# Patient Record
Sex: MALE | Race: WHITE | NOT HISPANIC OR LATINO | Employment: OTHER | URBAN - METROPOLITAN AREA
[De-identification: names, ages, dates, MRNs, and addresses within clinical notes are randomized per-mention and may not be internally consistent; named-entity substitution may affect disease eponyms.]

---

## 2021-05-30 ENCOUNTER — HOSPITAL ENCOUNTER (INPATIENT)
Facility: MEDICAL CENTER | Age: 71
LOS: 6 days | DRG: 280 | End: 2021-06-07
Attending: EMERGENCY MEDICINE | Admitting: STUDENT IN AN ORGANIZED HEALTH CARE EDUCATION/TRAINING PROGRAM
Payer: MEDICARE

## 2021-05-30 DIAGNOSIS — N30.00 ACUTE CYSTITIS WITHOUT HEMATURIA: ICD-10-CM

## 2021-05-30 DIAGNOSIS — R11.2 NON-INTRACTABLE VOMITING WITH NAUSEA, UNSPECIFIED VOMITING TYPE: ICD-10-CM

## 2021-05-30 PROCEDURE — 99285 EMERGENCY DEPT VISIT HI MDM: CPT

## 2021-05-30 PROCEDURE — 87040 BLOOD CULTURE FOR BACTERIA: CPT

## 2021-05-30 PROCEDURE — 80053 COMPREHEN METABOLIC PANEL: CPT

## 2021-05-30 PROCEDURE — 84484 ASSAY OF TROPONIN QUANT: CPT

## 2021-05-30 PROCEDURE — 83605 ASSAY OF LACTIC ACID: CPT

## 2021-05-30 PROCEDURE — 85025 COMPLETE CBC W/AUTO DIFF WBC: CPT

## 2021-05-30 RX ORDER — SODIUM CHLORIDE, SODIUM LACTATE, POTASSIUM CHLORIDE, AND CALCIUM CHLORIDE .6; .31; .03; .02 G/100ML; G/100ML; G/100ML; G/100ML
30 INJECTION, SOLUTION INTRAVENOUS ONCE
Status: COMPLETED | OUTPATIENT
Start: 2021-05-31 | End: 2021-05-31

## 2021-05-31 ENCOUNTER — APPOINTMENT (OUTPATIENT)
Dept: CARDIOLOGY | Facility: MEDICAL CENTER | Age: 71
DRG: 280 | End: 2021-05-31
Attending: STUDENT IN AN ORGANIZED HEALTH CARE EDUCATION/TRAINING PROGRAM
Payer: MEDICARE

## 2021-05-31 ENCOUNTER — APPOINTMENT (OUTPATIENT)
Dept: RADIOLOGY | Facility: MEDICAL CENTER | Age: 71
DRG: 280 | End: 2021-05-31
Attending: HOSPITALIST
Payer: MEDICARE

## 2021-05-31 ENCOUNTER — HOSPITAL ENCOUNTER (OUTPATIENT)
Dept: RADIOLOGY | Facility: MEDICAL CENTER | Age: 71
End: 2021-05-31
Attending: EMERGENCY MEDICINE
Payer: MEDICARE

## 2021-05-31 PROBLEM — E66.9 OBESITY (BMI 30-39.9): Status: ACTIVE | Noted: 2021-05-31

## 2021-05-31 PROBLEM — N39.0 UTI (URINARY TRACT INFECTION): Status: ACTIVE | Noted: 2021-05-31

## 2021-05-31 PROBLEM — I21.4 NSTEMI (NON-ST ELEVATED MYOCARDIAL INFARCTION) (HCC): Status: ACTIVE | Noted: 2021-05-31

## 2021-05-31 PROBLEM — I27.20 PULMONARY HYPERTENSION (HCC): Status: ACTIVE | Noted: 2021-05-31

## 2021-05-31 PROBLEM — I26.99 ACUTE PULMONARY EMBOLISM (HCC): Status: ACTIVE | Noted: 2021-05-31

## 2021-05-31 LAB
ALBUMIN SERPL BCP-MCNC: 3.9 G/DL (ref 3.2–4.9)
ALBUMIN/GLOB SERPL: 1.2 G/DL
ALP SERPL-CCNC: 207 U/L (ref 30–99)
ALT SERPL-CCNC: 38 U/L (ref 2–50)
ANION GAP SERPL CALC-SCNC: 11 MMOL/L (ref 7–16)
APPEARANCE UR: ABNORMAL
AST SERPL-CCNC: 27 U/L (ref 12–45)
BACTERIA #/AREA URNS HPF: ABNORMAL /HPF
BASOPHILS # BLD AUTO: 0.3 % (ref 0–1.8)
BASOPHILS # BLD: 0.03 K/UL (ref 0–0.12)
BILIRUB SERPL-MCNC: 0.4 MG/DL (ref 0.1–1.5)
BILIRUB UR QL STRIP.AUTO: NEGATIVE
BUN SERPL-MCNC: 25 MG/DL (ref 8–22)
CALCIUM SERPL-MCNC: 8.5 MG/DL (ref 8.5–10.5)
CHLORIDE SERPL-SCNC: 106 MMOL/L (ref 96–112)
CO2 SERPL-SCNC: 21 MMOL/L (ref 20–33)
COLOR UR: YELLOW
CREAT SERPL-MCNC: 1.39 MG/DL (ref 0.5–1.4)
EKG IMPRESSION: NORMAL
EOSINOPHIL # BLD AUTO: 0.06 K/UL (ref 0–0.51)
EOSINOPHIL NFR BLD: 0.5 % (ref 0–6.9)
EPI CELLS #/AREA URNS HPF: NEGATIVE /HPF
ERYTHROCYTE [DISTWIDTH] IN BLOOD BY AUTOMATED COUNT: 43.8 FL (ref 35.9–50)
GLOBULIN SER CALC-MCNC: 3.3 G/DL (ref 1.9–3.5)
GLUCOSE SERPL-MCNC: 123 MG/DL (ref 65–99)
GLUCOSE UR STRIP.AUTO-MCNC: NEGATIVE MG/DL
HCT VFR BLD AUTO: 46.9 % (ref 42–52)
HGB BLD-MCNC: 15.3 G/DL (ref 14–18)
HYALINE CASTS #/AREA URNS LPF: ABNORMAL /LPF
IMM GRANULOCYTES # BLD AUTO: 0.06 K/UL (ref 0–0.11)
IMM GRANULOCYTES NFR BLD AUTO: 0.5 % (ref 0–0.9)
KETONES UR STRIP.AUTO-MCNC: NEGATIVE MG/DL
LACTATE BLD-SCNC: 2 MMOL/L (ref 0.5–2)
LACTATE BLD-SCNC: 2.7 MMOL/L (ref 0.5–2)
LEUKOCYTE ESTERASE UR QL STRIP.AUTO: ABNORMAL
LV EJECT FRACT  99904: 50
LV EJECT FRACT MOD 2C 99903: 71.59
LV EJECT FRACT MOD 4C 99902: 31.02
LV EJECT FRACT MOD BP 99901: 56.68
LYMPHOCYTES # BLD AUTO: 1.58 K/UL (ref 1–4.8)
LYMPHOCYTES NFR BLD: 14.3 % (ref 22–41)
MCH RBC QN AUTO: 28 PG (ref 27–33)
MCHC RBC AUTO-ENTMCNC: 32.6 G/DL (ref 33.7–35.3)
MCV RBC AUTO: 85.9 FL (ref 81.4–97.8)
MICRO URNS: ABNORMAL
MONOCYTES # BLD AUTO: 0.85 K/UL (ref 0–0.85)
MONOCYTES NFR BLD AUTO: 7.7 % (ref 0–13.4)
NEUTROPHILS # BLD AUTO: 8.5 K/UL (ref 1.82–7.42)
NEUTROPHILS NFR BLD: 76.7 % (ref 44–72)
NITRITE UR QL STRIP.AUTO: POSITIVE
NRBC # BLD AUTO: 0 K/UL
NRBC BLD-RTO: 0 /100 WBC
PH UR STRIP.AUTO: 5 [PH] (ref 5–8)
PLATELET # BLD AUTO: 286 K/UL (ref 164–446)
PMV BLD AUTO: 10.8 FL (ref 9–12.9)
POTASSIUM SERPL-SCNC: 4.3 MMOL/L (ref 3.6–5.5)
PROT SERPL-MCNC: 7.2 G/DL (ref 6–8.2)
PROT UR QL STRIP: 100 MG/DL
RBC # BLD AUTO: 5.46 M/UL (ref 4.7–6.1)
RBC # URNS HPF: ABNORMAL /HPF
RBC UR QL AUTO: ABNORMAL
SODIUM SERPL-SCNC: 138 MMOL/L (ref 135–145)
SP GR UR STRIP.AUTO: 1.02
TROPONIN T SERPL-MCNC: 38 NG/L (ref 6–19)
TROPONIN T SERPL-MCNC: 41 NG/L (ref 6–19)
TROPONIN T SERPL-MCNC: 45 NG/L (ref 6–19)
UROBILINOGEN UR STRIP.AUTO-MCNC: 0.2 MG/DL
WBC # BLD AUTO: 11.1 K/UL (ref 4.8–10.8)
WBC #/AREA URNS HPF: ABNORMAL /HPF

## 2021-05-31 PROCEDURE — 93010 ELECTROCARDIOGRAM REPORT: CPT | Performed by: INTERNAL MEDICINE

## 2021-05-31 PROCEDURE — 83605 ASSAY OF LACTIC ACID: CPT

## 2021-05-31 PROCEDURE — 700111 HCHG RX REV CODE 636 W/ 250 OVERRIDE (IP): Performed by: INTERNAL MEDICINE

## 2021-05-31 PROCEDURE — 87086 URINE CULTURE/COLONY COUNT: CPT

## 2021-05-31 PROCEDURE — G0378 HOSPITAL OBSERVATION PER HR: HCPCS

## 2021-05-31 PROCEDURE — 81001 URINALYSIS AUTO W/SCOPE: CPT

## 2021-05-31 PROCEDURE — 99205 OFFICE O/P NEW HI 60 MIN: CPT | Performed by: INTERNAL MEDICINE

## 2021-05-31 PROCEDURE — 700111 HCHG RX REV CODE 636 W/ 250 OVERRIDE (IP): Performed by: HOSPITALIST

## 2021-05-31 PROCEDURE — 96372 THER/PROPH/DIAG INJ SC/IM: CPT

## 2021-05-31 PROCEDURE — 87077 CULTURE AEROBIC IDENTIFY: CPT | Mod: 91

## 2021-05-31 PROCEDURE — 36415 COLL VENOUS BLD VENIPUNCTURE: CPT

## 2021-05-31 PROCEDURE — 700111 HCHG RX REV CODE 636 W/ 250 OVERRIDE (IP): Performed by: STUDENT IN AN ORGANIZED HEALTH CARE EDUCATION/TRAINING PROGRAM

## 2021-05-31 PROCEDURE — 71045 X-RAY EXAM CHEST 1 VIEW: CPT

## 2021-05-31 PROCEDURE — 99220 PR INITIAL OBSERVATION CARE,LEVL III: CPT | Performed by: STUDENT IN AN ORGANIZED HEALTH CARE EDUCATION/TRAINING PROGRAM

## 2021-05-31 PROCEDURE — 96365 THER/PROPH/DIAG IV INF INIT: CPT

## 2021-05-31 PROCEDURE — 700111 HCHG RX REV CODE 636 W/ 250 OVERRIDE (IP): Performed by: EMERGENCY MEDICINE

## 2021-05-31 PROCEDURE — 93005 ELECTROCARDIOGRAM TRACING: CPT | Performed by: STUDENT IN AN ORGANIZED HEALTH CARE EDUCATION/TRAINING PROGRAM

## 2021-05-31 PROCEDURE — 96375 TX/PRO/DX INJ NEW DRUG ADDON: CPT

## 2021-05-31 PROCEDURE — 700105 HCHG RX REV CODE 258: Performed by: EMERGENCY MEDICINE

## 2021-05-31 PROCEDURE — 84484 ASSAY OF TROPONIN QUANT: CPT | Mod: 91

## 2021-05-31 PROCEDURE — 93306 TTE W/DOPPLER COMPLETE: CPT

## 2021-05-31 PROCEDURE — A9270 NON-COVERED ITEM OR SERVICE: HCPCS | Performed by: STUDENT IN AN ORGANIZED HEALTH CARE EDUCATION/TRAINING PROGRAM

## 2021-05-31 PROCEDURE — 87186 SC STD MICRODIL/AGAR DIL: CPT

## 2021-05-31 PROCEDURE — 71275 CT ANGIOGRAPHY CHEST: CPT

## 2021-05-31 PROCEDURE — 700102 HCHG RX REV CODE 250 W/ 637 OVERRIDE(OP): Performed by: STUDENT IN AN ORGANIZED HEALTH CARE EDUCATION/TRAINING PROGRAM

## 2021-05-31 PROCEDURE — 93306 TTE W/DOPPLER COMPLETE: CPT | Mod: 26 | Performed by: INTERNAL MEDICINE

## 2021-05-31 PROCEDURE — 700117 HCHG RX CONTRAST REV CODE 255: Performed by: HOSPITALIST

## 2021-05-31 RX ORDER — ACETAMINOPHEN 325 MG/1
650 TABLET ORAL EVERY 6 HOURS PRN
Status: DISCONTINUED | OUTPATIENT
Start: 2021-05-31 | End: 2021-06-07 | Stop reason: HOSPADM

## 2021-05-31 RX ORDER — FUROSEMIDE 10 MG/ML
20 INJECTION INTRAMUSCULAR; INTRAVENOUS ONCE
Status: COMPLETED | OUTPATIENT
Start: 2021-05-31 | End: 2021-05-31

## 2021-05-31 RX ORDER — ONDANSETRON 2 MG/ML
4 INJECTION INTRAMUSCULAR; INTRAVENOUS EVERY 4 HOURS PRN
Status: DISCONTINUED | OUTPATIENT
Start: 2021-05-31 | End: 2021-06-07 | Stop reason: HOSPADM

## 2021-05-31 RX ORDER — MORPHINE SULFATE 4 MG/ML
4 INJECTION, SOLUTION INTRAMUSCULAR; INTRAVENOUS EVERY 4 HOURS PRN
Status: DISCONTINUED | OUTPATIENT
Start: 2021-05-31 | End: 2021-06-07 | Stop reason: HOSPADM

## 2021-05-31 RX ORDER — HEPARIN SODIUM 5000 [USP'U]/ML
5000 INJECTION, SOLUTION INTRAVENOUS; SUBCUTANEOUS EVERY 8 HOURS
Status: DISCONTINUED | OUTPATIENT
Start: 2021-05-31 | End: 2021-05-31

## 2021-05-31 RX ORDER — OXYCODONE HYDROCHLORIDE 5 MG/1
5 TABLET ORAL EVERY 4 HOURS PRN
Status: DISCONTINUED | OUTPATIENT
Start: 2021-05-31 | End: 2021-06-07 | Stop reason: HOSPADM

## 2021-05-31 RX ADMIN — ENOXAPARIN SODIUM 100 MG: 100 INJECTION SUBCUTANEOUS at 15:51

## 2021-05-31 RX ADMIN — ACETAMINOPHEN 650 MG: 325 TABLET, FILM COATED ORAL at 20:05

## 2021-05-31 RX ADMIN — CEFTRIAXONE SODIUM 2 G: 2 INJECTION, POWDER, FOR SOLUTION INTRAMUSCULAR; INTRAVENOUS at 04:05

## 2021-05-31 RX ADMIN — IOHEXOL 88 ML: 350 INJECTION, SOLUTION INTRAVENOUS at 15:00

## 2021-05-31 RX ADMIN — MORPHINE SULFATE 4 MG: 4 INJECTION INTRAVENOUS at 23:55

## 2021-05-31 RX ADMIN — FUROSEMIDE 20 MG: 10 INJECTION, SOLUTION INTRAMUSCULAR; INTRAVENOUS at 22:40

## 2021-05-31 RX ADMIN — HEPARIN SODIUM 5000 UNITS: 5000 INJECTION, SOLUTION INTRAVENOUS; SUBCUTANEOUS at 05:28

## 2021-05-31 RX ADMIN — SODIUM CHLORIDE, POTASSIUM CHLORIDE, SODIUM LACTATE AND CALCIUM CHLORIDE 2121 ML: 600; 310; 30; 20 INJECTION, SOLUTION INTRAVENOUS at 00:01

## 2021-05-31 ASSESSMENT — LIFESTYLE VARIABLES
CONSUMPTION TOTAL: NEGATIVE
ON A TYPICAL DAY WHEN YOU DRINK ALCOHOL HOW MANY DRINKS DO YOU HAVE: 0
TOTAL SCORE: 0
ALCOHOL_USE: NO
HAVE YOU EVER FELT YOU SHOULD CUT DOWN ON YOUR DRINKING: NO
HOW MANY TIMES IN THE PAST YEAR HAVE YOU HAD 5 OR MORE DRINKS IN A DAY: 0
HAVE PEOPLE ANNOYED YOU BY CRITICIZING YOUR DRINKING: NO
EVER HAD A DRINK FIRST THING IN THE MORNING TO STEADY YOUR NERVES TO GET RID OF A HANGOVER: NO
EVER FELT BAD OR GUILTY ABOUT YOUR DRINKING: NO
AVERAGE NUMBER OF DAYS PER WEEK YOU HAVE A DRINK CONTAINING ALCOHOL: 0
TOTAL SCORE: 0
TOTAL SCORE: 0

## 2021-05-31 ASSESSMENT — COGNITIVE AND FUNCTIONAL STATUS - GENERAL
DRESSING REGULAR LOWER BODY CLOTHING: A LITTLE
WALKING IN HOSPITAL ROOM: A LITTLE
STANDING UP FROM CHAIR USING ARMS: A LITTLE
CLIMB 3 TO 5 STEPS WITH RAILING: A LITTLE
SUGGESTED CMS G CODE MODIFIER DAILY ACTIVITY: CI
SUGGESTED CMS G CODE MODIFIER MOBILITY: CK
DAILY ACTIVITIY SCORE: 23
MOVING TO AND FROM BED TO CHAIR: A LITTLE
MOVING FROM LYING ON BACK TO SITTING ON SIDE OF FLAT BED: A LITTLE
MOBILITY SCORE: 19

## 2021-05-31 ASSESSMENT — PATIENT HEALTH QUESTIONNAIRE - PHQ9
9. THOUGHTS THAT YOU WOULD BE BETTER OFF DEAD, OR OF HURTING YOURSELF: NOT AT ALL
5. POOR APPETITE OR OVEREATING: MORE THAN HALF THE DAYS
8. MOVING OR SPEAKING SO SLOWLY THAT OTHER PEOPLE COULD HAVE NOTICED. OR THE OPPOSITE, BEING SO FIGETY OR RESTLESS THAT YOU HAVE BEEN MOVING AROUND A LOT MORE THAN USUAL: SEVERAL DAYS
SUM OF ALL RESPONSES TO PHQ QUESTIONS 1-9: 17
1. LITTLE INTEREST OR PLEASURE IN DOING THINGS: MORE THAN HALF THE DAYS
4. FEELING TIRED OR HAVING LITTLE ENERGY: NEARLY EVERY DAY
7. TROUBLE CONCENTRATING ON THINGS, SUCH AS READING THE NEWSPAPER OR WATCHING TELEVISION: MORE THAN HALF THE DAYS
3. TROUBLE FALLING OR STAYING ASLEEP OR SLEEPING TOO MUCH: MORE THAN HALF THE DAYS
2. FEELING DOWN, DEPRESSED, IRRITABLE, OR HOPELESS: NEARLY EVERY DAY
6. FEELING BAD ABOUT YOURSELF - OR THAT YOU ARE A FAILURE OR HAVE LET YOURSELF OR YOUR FAMILY DOWN: MORE THAN HALF THE DAYS
SUM OF ALL RESPONSES TO PHQ9 QUESTIONS 1 AND 2: 5

## 2021-05-31 ASSESSMENT — FIBROSIS 4 INDEX: FIB4 SCORE: 0.96

## 2021-05-31 ASSESSMENT — PAIN DESCRIPTION - PAIN TYPE
TYPE: ACUTE PAIN

## 2021-05-31 ASSESSMENT — ENCOUNTER SYMPTOMS
RESPIRATORY NEGATIVE: 1
PSYCHIATRIC NEGATIVE: 1
NEUROLOGICAL NEGATIVE: 1
MUSCULOSKELETAL NEGATIVE: 1
EYES NEGATIVE: 1
NAUSEA: 1

## 2021-05-31 NOTE — ASSESSMENT & PLAN NOTE
Admit patient to  telemetry unit  Initial troponin 41 -> 38, likely demand ischemia from large PE.    EKG - NSR w/ non specific T wave changes in lateral leads  Echocardiogram

## 2021-05-31 NOTE — PLAN OF CARE (IOPOC)
Updated by Dr Lei re: ECHO findings RSVP 70  Patient was in car driving to [place], high DVT/PE probability  LMWH q12 tx doses  Check CTA and Duplex

## 2021-05-31 NOTE — ED NOTES
Urine collected and sent to lab. Patient urinated on himself while providing urine sample. Patient bedding and gown changed. Patient provided water. No further needs at this time.

## 2021-05-31 NOTE — CARE PLAN
The patient is Watcher - Medium risk of patient condition declining or worsening      Progress made toward(s) clinical / shift goals:    Problem: Knowledge Deficit - Standard  Goal: Patient and family/care givers will demonstrate understanding of plan of care, disease process/condition, diagnostic tests and medications  Outcome: Progressing  Note: Pt educated about disease process. Reason why medications are taken. And informed about treatment plan.      Problem: Fall Risk  Goal: Patient will remain free from falls  Outcome: Progressing  Note: Pt mobility assessed at beginning of shift. Pt is standby assist. Fall precautions in place. Non-slip socks on. Bed in lowest locked position. Call light within reach. Pt educated to call for assistance and verbalizes understanding.         Patient is not progressing towards the following goals:

## 2021-05-31 NOTE — ED NOTES
Blood drawn and sent to lab. Medicated patient per MAR. Patient denies further needs. Call light within reach.

## 2021-05-31 NOTE — PROGRESS NOTES
2 RN skin check complete with Kaylah ORTEGA.   Devices in place tele box.  Skin assessed under devices yes.  Confirmed pressure ulcers found on NA.  New potential pressure ulcers noted on NA. Wound consult placed NA.  The following interventions in place  -pillows in place for support and positioning  -pt educated on turning.    Bilateral feet dry and flaky

## 2021-05-31 NOTE — PLAN OF CARE (IOPOC)
"Per HPI  \"63 y.o. male who presented 5/30/2021 with chest pain.  Patient is from the [facility] and was on a drive to [place] see his kids when his car broke down he was able to get his car to nausea as he was going to present effects he felt nauseous and had pressure-like chest pain 8 out of 10 on the right side of his chest.  Concern for heart attack he states that he called EMS to be evaluated.  He never has had a heart attack or cardiac work-up before.  States that about 2 weeks ago he noticed gross hematuria in his urine.  Currently when seen at bedside he is asymptomatic.     In the ED, patient found to have normal vital signs.  He has a mild neutrophilic leukocytosis and troponin of 41.  UA is positive for UTI.  Chest x-ray shows no acute cardiopulmonary abnormality.  Patient was given Rocephin and lactated Ringer by ER physician.\"    Echo obtained, awaiting results  Troponin trending down  Cardiology consult pending echo findings  Continue to treat UTI  Follow labs        "

## 2021-05-31 NOTE — ED NOTES
Bedside report given to T707 SHANNON Adrian. Patient transported with ACLS RN on Zoll monitor. All patient belongings and chart sent with patient. Patient care transferred. Receiving RN assuming care.

## 2021-05-31 NOTE — ASSESSMENT & PLAN NOTE
Admit patient to medical floor  Urine culture w/ ESBL, ABX per antimicorbial stewardship.    Blood cultures NGTD. Compled 5 days therapy on 6/5  Lactic acid elevated initially, trended to normal

## 2021-05-31 NOTE — PROGRESS NOTES
Assumed care of PT A&O 4. Pt resting in bed with no signs of labored breathing. On 3L NC. Tele monitor in place, cardiac rhythm being monitored. Call light within reach, bed in lowest position, upper bed rails up. Pt was updated on plan of care for the day. Will continue to monitor.

## 2021-05-31 NOTE — PROGRESS NOTES
Pulmonary Chart eval for intermediate high risk PE  Full Consult to follow     62 yo male admitted with chest pain found to have segmental and subsegmental PE  RV strain seen on ECHO and on CT LV: RV 1: 1.6  HR < 110. Systolic > 90, hypoxic RA needing 3 l of oxygen  To note his RVSP on 70 mmhg which suggest PE is not the acute cause of the severe pulm htn  Combination likely with diastolic dysfunction    Impression  High intermediate risk PE    Recommend:  -Observe ICU overnight   - Lovenox 1 mg /kg bid as hemodynamically stable - no current indication for TPA  - did not see contraindication for EKOS if TPA fails if needed  - if stable overnight can d/c on NOAC  Reason for overnite icu observation   JANENE score II - 18% risk of complications of PE at 30 days and 6.8% risk of moratality PE related at 30 days  sPESI = 2 = due to his diastolic dysfunction - 8.9% risk of death    D/w DR. Rogel

## 2021-05-31 NOTE — ED NOTES
Patient noted to have oxygen saturation 83% on room air. Patient placed on 2 L nasal cannula. Oxygen saturation 92% at this time.

## 2021-05-31 NOTE — ED NOTES
"Patient reports not having eaten or drinking adequately for 3 days. Prior to EMS arrival patient reports blurred vision, dizziness, and inability to walk. After EMS medication administration patient feels \"a lot better, but still not right.\"    Bilateral BP    /81  /91  "

## 2021-05-31 NOTE — H&P
"Hospital Medicine History & Physical Note    Date of Service  5/31/2021    Primary Care Physician  No primary care provider on file.    Consultants  None    Code Status  Full Code    Chief Complaint  Chief Complaint   Patient presents with   • N/V     Patient BIB EMS for \"pain all over\". Patient denies pain at this time. Nausea CC, controlled by 4 mg zofran en route       History of Presenting Illness  63 y.o. male who presented 5/30/2021 with chest pain.  Patient is from the [facility] and was on a drive to [place] see his kids when his car broke down he was able to get his car to [facility] as he was going to present effects he felt nauseous and had pressure-like chest pain 8 out of 10 on the right side of his chest.  Concern for heart attack he states that he called EMS to be evaluated.  He never has had a heart attack or cardiac work-up before.  States that about 2 weeks ago he noticed gross hematuria in his urine.  Currently when seen at bedside he is asymptomatic.    In the ED, patient found to have normal vital signs.  He has a mild neutrophilic leukocytosis and troponin of 41.  UA is positive for UTI.  Chest x-ray shows no acute cardiopulmonary abnormality.  Patient was given Rocephin and lactated Ringer by ER physician.    Review of Systems  Review of Systems   Constitutional: Positive for malaise/fatigue.   HENT: Negative.    Eyes: Negative.    Respiratory: Negative.    Cardiovascular: Positive for chest pain.   Gastrointestinal: Positive for nausea.   Genitourinary: Negative.    Musculoskeletal: Negative.    Skin: Negative.    Neurological: Negative.    Endo/Heme/Allergies: Negative.    Psychiatric/Behavioral: Negative.          Past Medical History  No pertinent medical history    Surgical History  No pertinent surgical history    Family History  No pertinent family history    Social History   reports that he has never smoked. He has never used smokeless tobacco. He reports previous drug use. He reports that " he does not drink alcohol.    Allergies  Allergies   Allergen Reactions   • Codeine Vomiting       Medications  None       Physical Exam  Temp:  [36.2 °C (97.2 °F)] 36.2 °C (97.2 °F)  Pulse:  [] 91  Resp:  [13-20] 20  BP: (108-147)/(70-94) 125/85  SpO2:  [83 %-97 %] 91 %    Physical Exam  Constitutional:       Appearance: Normal appearance. He is obese.   HENT:      Head: Normocephalic.      Nose: Nose normal.      Mouth/Throat:      Mouth: Mucous membranes are moist.   Eyes:      Pupils: Pupils are equal, round, and reactive to light.   Cardiovascular:      Rate and Rhythm: Normal rate and regular rhythm.      Pulses: Normal pulses.   Pulmonary:      Effort: Pulmonary effort is normal.      Breath sounds: Normal breath sounds.   Abdominal:      General: Abdomen is flat. Bowel sounds are normal.      Palpations: Abdomen is soft.   Musculoskeletal:         General: Normal range of motion.   Skin:     General: Skin is warm.   Neurological:      General: No focal deficit present.      Mental Status: He is alert and oriented to person, place, and time. Mental status is at baseline.   Psychiatric:         Mood and Affect: Mood normal.           Laboratory:  Recent Labs     05/30/21  2318   WBC 11.1*   RBC 5.46   HEMOGLOBIN 15.3   HEMATOCRIT 46.9   MCV 85.9   MCH 28.0   MCHC 32.6*   RDW 43.8   PLATELETCT 286   MPV 10.8     Recent Labs     05/30/21  2318   SODIUM 138   POTASSIUM 4.3   CHLORIDE 106   CO2 21   GLUCOSE 123*   BUN 25*   CREATININE 1.39   CALCIUM 8.5     Recent Labs     05/30/21  2318   ALTSGPT 38   ASTSGOT 27   ALKPHOSPHAT 207*   TBILIRUBIN 0.4   GLUCOSE 123*         No results for input(s): NTPROBNP in the last 72 hours.      Recent Labs     05/30/21  2318   TROPONINT 41*       Imaging:  DX-CHEST-PORTABLE (1 VIEW)   Final Result         No acute cardiac or pulmonary abnormality is identified.            Assessment/Plan:  I anticipate this patient is appropriate for observation status at this time.    *  NSTEMI (non-ST elevated myocardial infarction) ([facility])  Assessment & Plan  Admit patient to  telemetry unit  Initial troponin 41 -> 38  EKG - NSR w/ non specific T wave changes in lateral leads  Echocardiogram  Cardiology consult in a.m.      Obesity (BMI 30-39.9)  Assessment & Plan  15 minutes spent counseling patient on benefits of nutrition and healthy lifestyle    UTI (urinary tract infection)  Assessment & Plan  Admit patient to medical floor  Given rocephin 2 g q24, continue   Urine culture  Blood cultures in process  Lactic acid elevated initially, trended to normal

## 2021-05-31 NOTE — ED TRIAGE NOTES
"Howard Quezada  63 y.o. male  Chief Complaint   Patient presents with   • N/V     Patient BIB EMS for \"pain all over\". Patient denies pain at this time. Nausea CC, controlled by 4 mg zofran en route       Pt BIB EMS for above complaint.    EMS medications:    4 mg zofran  300 mL LR     EMS FSBG 169    Pt is alert and oriented, speaking in full sentences, follows commands and responds appropriately to questions. Resp are even and unlabored. No behavioral indicators of pain.     This RN masked and in appropriate PPE during encounter.     Vitals:    05/30/21 2315   BP: 118/81   Pulse: (!) 101   Resp: 20   Temp: 36.2 °C (97.2 °F)   SpO2: 95%     "

## 2021-05-31 NOTE — ED PROVIDER NOTES
"ED Provider Note        Primary care provider: No primary care provider on file.    I verified that the patient was wearing a mask and I was wearing appropriate PPE every time I entered the room. The patient's mask was on the patient at all times during my encounter except for a brief view of the oropharynx.      CHIEF COMPLAINT  Chief Complaint   Patient presents with   • N/V     Patient BIB EMS for \"pain all over\". Patient denies pain at this time. Nausea CC, controlled by 4 mg zofran en route       HPI  Howard Quezada is a 63 y.o. male who presents to the Emergency Department with chief complaint of nausea vomiting.  Patient was traveling from the [facility] when his car broke down 3 days prior.  States that he has been parked next to an auto parts store trying to fix his car.  He reports that he did have her drinks water he feels as though he is gotten himself extremely dehydrated.  Patient reports that over the last day he has had profound nausea multiple episodes of emesis to the point where he was dry heaving.  He reports very minimal abdominal discomfort he states that his urine has been dark but he is been urinating normally and he reports that his bowel movements have been normal.  He has had slight chills no measured fever he has had a moderate frontal headache no neck pain.  Patient states he does have a previous history of urinary retention secondary to enlarged prostate.  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  No cough no congestion no chest pain no shortness of breath no other acute symptoms or concerns at this time.  He was transferred by EMS and was given 4 mg of Zofran in route and reports feeling much better at this time.    REVIEW OF SYSTEMS  10 systems reviewed and otherwise negative, pertinent positives and negatives listed in the history of " "present illness.    PAST MEDICAL HISTORY   Benign prostatic hypertrophy    SURGICAL HISTORY  patient denies any surgical history    SOCIAL HISTORY  Social History     Tobacco Use   • Smoking status: Never Smoker   • Smokeless tobacco: Never Used   Substance Use Topics   • Alcohol use: Never   • Drug use: Not Currently     Comment: meth, cocaine, LSD      Social History     Substance and Sexual Activity   Drug Use Not Currently    Comment: meth, cocaine, LSD       FAMILY HISTORY  Non-Contributory    CURRENT MEDICATIONS  Home Medications     Reviewed by Preston Dunlap R.N. (Registered Nurse) on 05/30/21 at 2319  Med List Status: Partial   Medication Last Dose Status        Patient Gaurav Taking any Medications                       ALLERGIES  No Known Allergies    PHYSICAL EXAM  VITAL SIGNS: /91   Pulse (!) 101   Temp 36.2 °C (97.2 °F) (Temporal)   Resp 13   Ht 1.753 m (5' 9\")   Wt 101 kg (222 lb)   SpO2 93%   BMI 32.78 kg/m²   Pulse ox interpretation: I interpret this pulse ox as normal.  Constitutional: Alert and oriented x 3, moderate distress  HEENT: Atraumatic normocephalic, pupils are equal round, extraocular movements are intact. The nares is clear, external ears are normal, mouth shows dry mucous membranes  Neck: no obvious JVD or tracheal deviation  Cardiovascular: Tachycardic no murmur rub or gallop   Thorax & Lungs: No respiratory distress, no wheezes rales or rhonchi, No chest tenderness.   GI: Soft nontender nondistended positive bowel sounds, no peritoneal signs  Skin: Warm dry no obvious acute rash or lesion  Musculoskeletal: Moving all extremities with normal range strength, no acute  deformity  Neurologic: Cranial nerves III through XII are grossly intact, no sensory deficit, no cerebellar dysfunction   Psychiatric: Appropriate affect for situation at this time      DIAGNOSTIC STUDIES / PROCEDURES  LABS      Results for orders placed or performed during the hospital encounter of 05/30/21 "   LACTIC ACID   Result Value Ref Range    Lactic Acid 2.7 (H) 0.5 - 2.0 mmol/L   LACTIC ACID   Result Value Ref Range    Lactic Acid 2.0 0.5 - 2.0 mmol/L   CBC WITH DIFFERENTIAL   Result Value Ref Range    WBC 11.1 (H) 4.8 - 10.8 K/uL    RBC 5.46 4.70 - 6.10 M/uL    Hemoglobin 15.3 14.0 - 18.0 g/dL    Hematocrit 46.9 42.0 - 52.0 %    MCV 85.9 81.4 - 97.8 fL    MCH 28.0 27.0 - 33.0 pg    MCHC 32.6 (L) 33.7 - 35.3 g/dL    RDW 43.8 35.9 - 50.0 fL    Platelet Count 286 164 - 446 K/uL    MPV 10.8 9.0 - 12.9 fL    Neutrophils-Polys 76.70 (H) 44.00 - 72.00 %    Lymphocytes 14.30 (L) 22.00 - 41.00 %    Monocytes 7.70 0.00 - 13.40 %    Eosinophils 0.50 0.00 - 6.90 %    Basophils 0.30 0.00 - 1.80 %    Immature Granulocytes 0.50 0.00 - 0.90 %    Nucleated RBC 0.00 /100 WBC    Neutrophils (Absolute) 8.50 (H) 1.82 - 7.42 K/uL    Lymphs (Absolute) 1.58 1.00 - 4.80 K/uL    Monos (Absolute) 0.85 0.00 - 0.85 K/uL    Eos (Absolute) 0.06 0.00 - 0.51 K/uL    Baso (Absolute) 0.03 0.00 - 0.12 K/uL    Immature Granulocytes (abs) 0.06 0.00 - 0.11 K/uL    NRBC (Absolute) 0.00 K/uL   COMP METABOLIC PANEL   Result Value Ref Range    Sodium 138 135 - 145 mmol/L    Potassium 4.3 3.6 - 5.5 mmol/L    Chloride 106 96 - 112 mmol/L    Co2 21 20 - 33 mmol/L    Anion Gap 11.0 7.0 - 16.0    Glucose 123 (H) 65 - 99 mg/dL    Bun 25 (H) 8 - 22 mg/dL    Creatinine 1.39 0.50 - 1.40 mg/dL    Calcium 8.5 8.5 - 10.5 mg/dL    AST(SGOT) 27 12 - 45 U/L    ALT(SGPT) 38 2 - 50 U/L    Alkaline Phosphatase 207 (H) 30 - 99 U/L    Total Bilirubin 0.4 0.1 - 1.5 mg/dL    Albumin 3.9 3.2 - 4.9 g/dL    Total Protein 7.2 6.0 - 8.2 g/dL    Globulin 3.3 1.9 - 3.5 g/dL    A-G Ratio 1.2 g/dL   URINALYSIS    Specimen: Urine   Result Value Ref Range    Color Yellow     Character Cloudy (A)     Specific Gravity 1.021 <1.035    Ph 5.0 5.0 - 8.0    Glucose Negative Negative mg/dL    Ketones Negative Negative mg/dL    Protein 100 (A) Negative mg/dL    Bilirubin Negative Negative     Urobilinogen, Urine 0.2 Negative    Nitrite Positive (A) Negative    Leukocyte Esterase Moderate (A) Negative    Occult Blood Moderate (A) Negative    Micro Urine Req Microscopic    ESTIMATED GFR   Result Value Ref Range    GFR If African American >60 >60 mL/min/1.73 m 2    GFR If Non African American 52 (A) >60 mL/min/1.73 m 2   TROPONIN   Result Value Ref Range    Troponin T 41 (H) 6 - 19 ng/L   URINE MICROSCOPIC (W/UA)   Result Value Ref Range    WBC Packed (A) /hpf    RBC 2-5 (A) /hpf    Bacteria Many (A) None /hpf    Epithelial Cells Negative /hpf    Hyaline Cast 3-5 (A) /lpf   TROPONIN   Result Value Ref Range    Troponin T 38 (H) 6 - 19 ng/L   EKG   Result Value Ref Range    Report       [facility]    Test Date:  2021  Pt Name:    MIGUEL ÁNGEL PEREZ               Department: ER  MRN:        [id number]                      Room:       [facility]  Gender:     Male                         Technician: CAT  :        [id number]                   Requested By:VIKKI TORREZ  Order #:    [id number]                    Reading MD:    Measurements  Intervals                                Axis  Rate:       88                           P:          61  DC:         152                          QRS:        -18  QRSD:       78                           T:          263  QT:         388  QTc:        470    Interpretive Statements  SINUS RHYTHM  PROBABLE LVH WITH SECONDARY REPOL ABNRM  PROBABLE INFERIOR INFARCT, AGE INDETERMINATE  BASELINE WANDER IN LEAD(S) V1  No previous ECG available for comparison         All labs reviewed by me.      RADIOLOGY  DX-CHEST-PORTABLE (1 VIEW)   Final Result         No acute cardiac or pulmonary abnormality is identified.      EC-ECHOCARDIOGRAM COMPLETE W/O CONT    (Results Pending)     The radiologist's interpretation of all radiological studies have been reviewed by me.    COURSE & MEDICAL DECISION MAKING  Pertinent Labs & Imaging studies reviewed. (See chart for details)    11:31 PM -  "Patient seen and examined at bedside.       Patient noted to have slightly elevated blood pressure likely circumstantial secondary to presenting complaint. Referred to primary care physician for further evaluation.        Medical Decision Making: Patient presents with pain all over nausea vomiting slightly tachycardic he has no other SIRS criteria vital signs or laboratory findings however he does have slightly elevated lactic acid and he has evidence of urinary tract infection.  Patient's currently from out of town and currently homeless with his borderline labs and vital signs I have concerns with compliance with medication feel that he likely has very high risk of deterioration on discharge therefore I discussed his case with hospitalist Dr. Craig and he is admitted for ongoing evaluation and treatment admitted in guarded condition.    /91   Pulse (!) 101   Temp 36.2 °C (97.2 °F) (Temporal)   Resp 13   Ht 1.753 m (5' 9\")   Wt 101 kg (222 lb)   SpO2 93%   BMI 32.78 kg/m²         FINAL IMPRESSION  1. Non-intractable vomiting with nausea, unspecified vomiting type Active   2. Acute cystitis without hematuria Active          This dictation has been created using voice recognition software and/or scribes. The accuracy of the dictation is limited by the abilities of the software and the expertise of the scribes. I expect there may be some errors of grammar and possibly content. I made every attempt to manually correct the errors within my dictation. However, errors related to voice recognition software and/or scribes may still exist and should be interpreted within the appropriate context.            "

## 2021-06-01 ENCOUNTER — APPOINTMENT (OUTPATIENT)
Dept: RADIOLOGY | Facility: MEDICAL CENTER | Age: 71
DRG: 280 | End: 2021-06-01
Attending: HOSPITALIST
Payer: MEDICARE

## 2021-06-01 PROBLEM — I26.09 ACUTE PULMONARY EMBOLISM WITH ACUTE COR PULMONALE (HCC): Status: ACTIVE | Noted: 2021-05-31

## 2021-06-01 LAB
ANION GAP SERPL CALC-SCNC: 9 MMOL/L (ref 7–16)
BUN SERPL-MCNC: 21 MG/DL (ref 8–22)
CALCIUM SERPL-MCNC: 8.5 MG/DL (ref 8.5–10.5)
CHLORIDE SERPL-SCNC: 102 MMOL/L (ref 96–112)
CO2 SERPL-SCNC: 24 MMOL/L (ref 20–33)
CREAT SERPL-MCNC: 1.36 MG/DL (ref 0.5–1.4)
EKG IMPRESSION: NORMAL
ERYTHROCYTE [DISTWIDTH] IN BLOOD BY AUTOMATED COUNT: 43.5 FL (ref 35.9–50)
GLUCOSE SERPL-MCNC: 109 MG/DL (ref 65–99)
HCT VFR BLD AUTO: 43.1 % (ref 42–52)
HCT VFR BLD AUTO: 44.7 % (ref 42–52)
HGB BLD-MCNC: 13.9 G/DL (ref 14–18)
HGB BLD-MCNC: 14.5 G/DL (ref 14–18)
MCH RBC QN AUTO: 28 PG (ref 27–33)
MCHC RBC AUTO-ENTMCNC: 32.4 G/DL (ref 33.7–35.3)
MCV RBC AUTO: 86.5 FL (ref 81.4–97.8)
PLATELET # BLD AUTO: 239 K/UL (ref 164–446)
PMV BLD AUTO: 10.1 FL (ref 9–12.9)
POTASSIUM SERPL-SCNC: 4.6 MMOL/L (ref 3.6–5.5)
RBC # BLD AUTO: 5.17 M/UL (ref 4.7–6.1)
SODIUM SERPL-SCNC: 135 MMOL/L (ref 135–145)
TROPONIN T SERPL-MCNC: 32 NG/L (ref 6–19)
WBC # BLD AUTO: 9.1 K/UL (ref 4.8–10.8)

## 2021-06-01 PROCEDURE — 770020 HCHG ROOM/CARE - TELE (206)

## 2021-06-01 PROCEDURE — 85018 HEMOGLOBIN: CPT

## 2021-06-01 PROCEDURE — 96372 THER/PROPH/DIAG INJ SC/IM: CPT

## 2021-06-01 PROCEDURE — 93970 EXTREMITY STUDY: CPT

## 2021-06-01 PROCEDURE — 700111 HCHG RX REV CODE 636 W/ 250 OVERRIDE (IP): Performed by: STUDENT IN AN ORGANIZED HEALTH CARE EDUCATION/TRAINING PROGRAM

## 2021-06-01 PROCEDURE — 700111 HCHG RX REV CODE 636 W/ 250 OVERRIDE (IP): Performed by: HOSPITALIST

## 2021-06-01 PROCEDURE — 99233 SBSQ HOSP IP/OBS HIGH 50: CPT | Performed by: HOSPITALIST

## 2021-06-01 PROCEDURE — 85027 COMPLETE CBC AUTOMATED: CPT

## 2021-06-01 PROCEDURE — 700105 HCHG RX REV CODE 258: Performed by: STUDENT IN AN ORGANIZED HEALTH CARE EDUCATION/TRAINING PROGRAM

## 2021-06-01 PROCEDURE — 99233 SBSQ HOSP IP/OBS HIGH 50: CPT | Mod: GC | Performed by: INTERNAL MEDICINE

## 2021-06-01 PROCEDURE — 80048 BASIC METABOLIC PNL TOTAL CA: CPT

## 2021-06-01 PROCEDURE — 85014 HEMATOCRIT: CPT

## 2021-06-01 PROCEDURE — 93005 ELECTROCARDIOGRAM TRACING: CPT | Performed by: HOSPITALIST

## 2021-06-01 PROCEDURE — 93970 EXTREMITY STUDY: CPT | Mod: 26 | Performed by: INTERNAL MEDICINE

## 2021-06-01 PROCEDURE — 36415 COLL VENOUS BLD VENIPUNCTURE: CPT

## 2021-06-01 PROCEDURE — 93010 ELECTROCARDIOGRAM REPORT: CPT | Performed by: INTERNAL MEDICINE

## 2021-06-01 PROCEDURE — 96376 TX/PRO/DX INJ SAME DRUG ADON: CPT

## 2021-06-01 PROCEDURE — 84484 ASSAY OF TROPONIN QUANT: CPT

## 2021-06-01 RX ADMIN — MORPHINE SULFATE 4 MG: 4 INJECTION INTRAVENOUS at 21:10

## 2021-06-01 RX ADMIN — MORPHINE SULFATE 4 MG: 4 INJECTION INTRAVENOUS at 05:18

## 2021-06-01 RX ADMIN — ENOXAPARIN SODIUM 100 MG: 100 INJECTION SUBCUTANEOUS at 15:02

## 2021-06-01 RX ADMIN — ENOXAPARIN SODIUM 100 MG: 100 INJECTION SUBCUTANEOUS at 02:47

## 2021-06-01 RX ADMIN — MORPHINE SULFATE 4 MG: 4 INJECTION INTRAVENOUS at 15:02

## 2021-06-01 RX ADMIN — CEFTRIAXONE SODIUM 2 G: 2 INJECTION, POWDER, FOR SOLUTION INTRAMUSCULAR; INTRAVENOUS at 05:20

## 2021-06-01 ASSESSMENT — PAIN DESCRIPTION - PAIN TYPE
TYPE: ACUTE PAIN

## 2021-06-01 ASSESSMENT — FIBROSIS 4 INDEX: FIB4 SCORE: 1.3

## 2021-06-01 NOTE — PROGRESS NOTES
Hospital Medicine Daily Progress Note    Date of Service  6/1/2021    Chief Complaint  71 y.o. male admitted 5/30/2021 with chest discomfort found to have extensive pulmonary emboli on CTA.    Hospital Course  No notes on file    Interval Problem Update  No acute overnight events.  Patient reports no interval worsening in his respiratory symptoms.  No chest discomfort.  Today he is endorsing some flank discomfort.  Tolerating anticoagulation well without any bleeds.    Consultants/Specialty  Assistance of Dr. Booth from pulmonary medicine greatly appreciated.    Code Status  Full Code    Disposition  Pending at this juncture.    Review of Systems  All systems reviewed and negative except as noted per above.    Physical Exam  Temp:  [36.2 °C (97.2 °F)-37 °C (98.6 °F)] 36.2 °C (97.2 °F)  Pulse:  [88-92] 89  Resp:  [18-22] 18  BP: (119-135)/() 119/87  SpO2:  [91 %-94 %] 93 %    General: No acute distress  HEENT atraumatic, normocephalic, pupils equal round reactive to light  Neck: No JVD  Chest: Respirations are unlabored  Cardiac: Physiologic S1 and S2  Abdomen: Soft, nontender, nondistended  Extremities: Without clubbing, cyanosis or edema  Neuro: Cranial nerves II through XII are grossly intact.  Psych: No anxiety, judgement intact.        Current Facility-Administered Medications:   •  acetaminophen (Tylenol) tablet 650 mg, 650 mg, Oral, Q6HRS PRN, Christiano Craig M.D., 650 mg at 05/31/21 2005  •  ondansetron (ZOFRAN) syringe/vial injection 4 mg, 4 mg, Intravenous, Q4HRS PRN, Christiano Craig M.D.  •  cefTRIAXone (ROCEPHIN) 2 g in  mL IVPB, 2 g, Intravenous, Q24HRS, Christiano Craig M.D., Stopped at 06/01/21 0550  •  enoxaparin (LOVENOX) inj 100 mg, 1 mg/kg, Subcutaneous, Q12HRS, Jeffry Rogel M.D., 100 mg at 06/01/21 1502  •  iohexol (OMNIPAQUE) 350 mg/mL, 88 mL, Intravenous, Once, Jeffry Rogel M.D.  •  oxyCODONE immediate-release (ROXICODONE) tablet 5 mg, 5 mg, Oral, Q4HRS PRN, Srinath MCKEON  MARTIN Whitehead.  •  morphine (pf) 4 mg/mL injection 4 mg, 4 mg, Intravenous, Q4HRS PRN, Srinath Whitehead M.D., 4 mg at 06/01/21 1502      Fluids    Intake/Output Summary (Last 24 hours) at 6/1/2021 1539  Last data filed at 6/1/2021 0300  Gross per 24 hour   Intake 120 ml   Output 1000 ml   Net -880 ml       Laboratory  Recent Labs     05/30/21 2318 06/01/21  0230   WBC 11.1* 9.1   RBC 5.46 5.17   HEMOGLOBIN 15.3 14.5   HEMATOCRIT 46.9 44.7   MCV 85.9 86.5   MCH 28.0 28.0   MCHC 32.6* 32.4*   RDW 43.8 43.5   PLATELETCT 286 239   MPV 10.8 10.1     Recent Labs     05/30/21 2318 06/01/21  0230   SODIUM 138 135   POTASSIUM 4.3 4.6   CHLORIDE 106 102   CO2 21 24   GLUCOSE 123* 109*   BUN 25* 21   CREATININE 1.39 1.36   CALCIUM 8.5 8.5                   Imaging  US-EXTREMITY VENOUS LOWER BILAT   Final Result      CT-CTA CHEST PULMONARY ARTERY W/ RECONS   Final Result         Extensive bilateral pulmonary emboli as detailed above with CT findings suggesting right heart strain.         These findings were discussed with ALEXANDER POWERS on 5/31/2021 3:52 PM.            EC-ECHOCARDIOGRAM COMPLETE W/O CONT   Final Result      DX-CHEST-PORTABLE (1 VIEW)   Final Result         No acute cardiac or pulmonary abnormality is identified.      US-RENAL    (Results Pending)        Assessment/Plan  * NSTEMI (non-ST elevated myocardial infarction) (HCC)  Assessment & Plan  Admit patient to  telemetry unit  Initial troponin 41 -> 38, likely demand ischemia from large PE.    EKG - NSR w/ non specific T wave changes in lateral leads  Echocardiogram      Acute pulmonary embolism with acute cor pulmonale (HCC)  Assessment & Plan  Pulmonary following along, can consider catheter-based interventions at their discretion.  Continue anticoagulation as ordered.    Obesity (BMI 30-39.9)  Assessment & Plan  15 minutes spent counseling patient on benefits of nutrition and healthy lifestyle    UTI (urinary tract infection)  Assessment & Plan  Admit patient to  medical floor  Given rocephin 2 g q24, continue   Urine culture  Blood cultures in process  Lactic acid elevated initially, trended to normal         VTE prophylaxis: LMWH

## 2021-06-01 NOTE — ASSESSMENT & PLAN NOTE
Acute pe, no hx  Possible trigger long distance driving  Right calf pain  Us lower extremity ordered  CTA with PE, segmental and subsegmental  Does not currently qualify for TPA or EKOS directed therapy  Lovenox subcu  Discussed the need for extended therapy for anticoagulation  If hemodynamically changes then please call back for re-evaluation.

## 2021-06-01 NOTE — ASSESSMENT & PLAN NOTE
Likely component of acute on chronic  Body habitus likely component of sleep apnea  Mildly reduced LVEF  No hx of PE  Supportive care as necessary in setting of acute pe and right heart failure, diuresis if hemodynamically tolerates

## 2021-06-01 NOTE — CARE PLAN
The patient is Watcher - Medium risk of patient condition declining or worsening    Shift Goals  Clinical Goals: maintain oxygen saturation and get US venous done  Patient Goals: rest    Progress made toward(s) clinical / shift goals:  patient oxygen saturation is at 92-94% on 5L NC. And patient got US venous done     Patient is not progressing towards the following goals:

## 2021-06-01 NOTE — ASSESSMENT & PLAN NOTE
On lovenox sq  Likely secondary to demand for PE and underlying right heart failure/pulmonary htn  Troponin stabilized

## 2021-06-01 NOTE — PROGRESS NOTES
Assumed care of PT A&O 4. Pt resting in bed with no signs of labored breathing. On 5.5L. Tele monitor in place, cardiac rhythm being monitored. Call light within reach, bed in lowest position, upper bed rails up. Pt was updated on plan of care for the day. Will continue to monitor.

## 2021-06-01 NOTE — ASSESSMENT & PLAN NOTE
Pulmonary following along, can consider catheter-based interventions at their discretion.    Continue anticoagulation as ordered (LMWH to [facility] on 6/2).

## 2021-06-01 NOTE — PROGRESS NOTES
Pulmonary Consultation       Patient ID:   Name:             Howard Quezada     YOB: 1958  Age:                 63 y.o.  male   MRN:               [id number]                                                  Reason of Consult:  Pulmonary embolism/sudden onset dyspnea while traveling cross-country    Subjective:  He was well this morning, getting up to edge of bed but not ambulating, currently on 5 LPM O2 support via nasal cannula, sharp pleuritic chest pain on the right with cough/deep breathing, no typical chest pain, no distention of neck veins, not tachycardic/hypotensive, breathing comfortably, able to speak in full sentences.  Also complains of right flank pain, has been previously diagnosed with renal stones and has had lithotripsy, urine culture also reveals E. coli, differential stones versus pyelonephritis/UTI complicated.    ROS:  Complete ROS was done and was negative except what mentioned in HPI     Past Medical History:  History reviewed. No pertinent past medical history.    Past surgical history:  History reviewed. No pertinent surgical history.    Family History:  History reviewed. No pertinent family history.    Hospital Medications:    Current Facility-Administered Medications:   •  acetaminophen (Tylenol) tablet 650 mg, 650 mg, Oral, Q6HRS PRN, Christiano Craig M.D., 650 mg at 05/31/21 2005  •  ondansetron (ZOFRAN) syringe/vial injection 4 mg, 4 mg, Intravenous, Q4HRS PRN, Christiano Craig M.D.  •  cefTRIAXone (ROCEPHIN) 2 g in  mL IVPB, 2 g, Intravenous, Q24HRS, Christiano Craig M.D., Stopped at 06/01/21 0550  •  enoxaparin (LOVENOX) inj 100 mg, 1 mg/kg, Subcutaneous, Q12HRS, Jeffry Rogel M.D., 100 mg at 06/01/21 0247  •  iohexol (OMNIPAQUE) 350 mg/mL, 88 mL, Intravenous, Once, Jeffry Rogel M.D.  •  oxyCODONE immediate-release (ROXICODONE) tablet 5 mg, 5 mg, Oral, Q4HRS PRN, Srinath Whitehead M.D.  •  morphine (pf) 4 mg/mL injection 4 mg, 4 mg, Intravenous, Q4HRS  "PRN, Srinath Whitehead M.D., 4 mg at 06/01/21 0518    Current Outpatient Medications:  No medications prior to admission.       Medication Allergy:  Allergies   Allergen Reactions   • Codeine Vomiting             Objective:   Vitals/ General Appearance:   Weight/BMI: Body mass index is 31.35 kg/m².  /92   Pulse 89   Temp 36.2 °C (97.2 °F) (Temporal)   Resp 18   Ht 1.753 m (5' 9\")   Wt 96.3 kg (212 lb 4.9 oz)   SpO2 92%   Vitals:    05/31/21 1929 05/31/21 2324 06/01/21 0330 06/01/21 0757   BP: 126/91 123/85 122/85 134/92   Pulse: 88 91 88 89   Resp: (!) 22 20 20 18   Temp: 36.4 °C (97.6 °F) 37 °C (98.6 °F) 36.5 °C (97.7 °F) 36.2 °C (97.2 °F)   TempSrc: Temporal Temporal Temporal Temporal   SpO2: 94% 92% 91% 92%   Weight:       Height:         Oxygen Therapy:  Pulse Oximetry: 92 %, O2 (LPM): 5.5, O2 Delivery Device: Silicone Nasal Cannula    Constitutional:   Well developed, Well nourished, No acute distress  HENMT:  Normocephalic, Atraumatic, Oropharynx moist mucous membranes, No oral exudates, Nose normal.  No thyromegaly.  Eyes:  EOMI, Conjunctiva normal, No discharge.  Neck:  Normal range of motion, No cervical tenderness,  no JVD.  Cardiovascular:  Normal heart rate, Normal rhythm, No murmurs, No rubs, No gallops.   Extremitites with intact distal pulses, no cyanosis, or edema.  Lungs:  Normal breath sounds, breath sounds clear to auscultation bilaterally,  no crackles, no wheezing.   Abdomen: Bowel sounds normal, Soft, tender to palpation over right flank, No guarding, No rebound, No masses, No hepatosplenomegaly.  Skin: Warm, Dry, No erythema, No rash, no induration.  Neurologic: Alert & oriented x 3, No focal deficits noted, cranial nerves II through X are grossly intact.  Psychiatric: Affect normal, Judgment normal, Mood normal.    Labs:  Recent Labs     05/30/21  2318 06/01/21  0230   WBC 11.1* 9.1   RBC 5.46 5.17   HEMOGLOBIN 15.3 14.5   HEMATOCRIT 46.9 44.7   MCV 85.9 86.5   MCH 28.0 28.0   MCHC " 32.6* 32.4*   RDW 43.8 43.5   PLATELETCT 286 239   MPV 10.8 10.1                 Recent Labs     05/30/21  2318 06/01/21  0230   SODIUM 138 135   POTASSIUM 4.3 4.6   CHLORIDE 106 102   CO2 21 24   GLUCOSE 123* 109*   BUN 25* 21     Recent Labs     05/30/21  2318 06/01/21  0230   SODIUM 138 135   POTASSIUM 4.3 4.6   CHLORIDE 106 102   CO2 21 24   BUN 25* 21   CREATININE 1.39 1.36   CALCIUM 8.5 8.5     No results found for this or any previous visit.      Imaging:   US-EXTREMITY VENOUS LOWER BILAT   Final Result      CT-CTA CHEST PULMONARY ARTERY W/ RECONS   Final Result         Extensive bilateral pulmonary emboli as detailed above with CT findings suggesting right heart strain.         These findings were discussed with ALEXANDER POWERS on 5/31/2021 3:52 PM.            EC-ECHOCARDIOGRAM COMPLETE W/O CONT   Final Result      DX-CHEST-PORTABLE (1 VIEW)   Final Result         No acute cardiac or pulmonary abnormality is identified.              Assessment and Plan:    63-year-old male 3 provoked DVT right lower extremity, with multiple segmental and subsegmental pulmonary embolism, hypoxic to 5 LPM O2 support, with echo evidence of pulmonary hypertension, D-shaped septum and right heart strain, not tachycardic/hypotensive, treated for NSTEMI and on full dose anticoagulation. Increasing hypoxia.    -Continue oxygen support  -Continue Lovenox 1 mg/kg twice daily  -No indication for EKOS/TPA at this time given intermediate severity of high-grade pulmonary embolism, risk of hemorrhage greater than benefit from EKOS/TPA, will continue to follow closely  -As patient is on full dose Lovenox and has right flank pain, low concern for retroperitoneal bleed but will get H/H        Principal Problem:    NSTEMI (non-ST elevated myocardial infarction) (HCC) POA: Unknown  Active Problems:    UTI (urinary tract infection) POA: Unknown    Obesity (BMI 30-39.9) POA: Unknown    Pulmonary hypertension (HCC) POA: Unknown    Acute pulmonary  embolism (HCC) POA: Unknown  Resolved Problems:    * No resolved hospital problems. *

## 2021-06-01 NOTE — CONSULTS
Critical Care Consultation    Date of consult: 5/31/2021    Referring Physician  Jeffry Rogel M.D.    Reason for Consultation  PE    History of Presenting Illness  63 y.o. male who presented 5/30/2021 with chest pain.  Treated for NSTEMI.  Segmental and subsegmental pulmonary emboli noted on CT imaging.  He is on 3 L oxygen and sating adequately. Echo with 50% EF.  There is right heart strain with RVSP 70 mmhg and elevated right atrial pressures and moderate to severe right heart failure along with .  He has right leg calf pain on exam but no significant swelling compared to left leg and lower ext swelling mild.  He is sitting up eating dinner, resting comfortably with regular respiratory rate.  He says he drives cross country often, going from [place] to [place]. Currently homeless and planning to move to [place].  He says spends significant time traveling in car.  ON vitals review HR has been less than 100, respiratory rate 13-21, o2 sats adequate in 90s and currently on 3 L oxygen and sbp 110s to 140.  Lactic acid normalized, chemistry adequate without significant abnormalities.  No significant signs of symptoms of infection.  Troponin 41 initially and stabilized.  UA positive for infection.  He does have a hx of urinary catheter with lithotripsy many years ago.  Denies urinary pain or recent fevers.  No recent falls per patient. Hx drug use but stopped 3-4 years ago except for marijuana use tthat continues, no significant etoh intake.  Denies hx clots, cardiac or pulmonary disease.        Code Status  Full Code    Review of Systems  ROS    Past Medical History   has no past medical history on file.    Surgical History   has no past surgical history on file.    Family History  family history is not on file.    Social History   reports that he has never smoked. He has never used smokeless tobacco. He reports previous drug use. He reports that he does not drink alcohol.    Medications  Home Medications      Reviewed by Nathaly Son PhT (Pharmacy Tech) on 05/31/21 at 0427  Med List Status: Complete   Medication Last Dose Status        Patient Gaurav Taking any Medications                     Current Facility-Administered Medications   Medication Dose Route Frequency Provider Last Rate Last Admin   • acetaminophen (Tylenol) tablet 650 mg  650 mg Oral Q6HRS PRN Christiano Craig M.D.       • ondansetron (ZOFRAN) syringe/vial injection 4 mg  4 mg Intravenous Q4HRS PRN Christiano Craig M.D.       • [START ON 6/1/2021] cefTRIAXone (ROCEPHIN) 2 g in  mL IVPB  2 g Intravenous Q24HRS Christiano Craig M.D.       • enoxaparin (LOVENOX) inj 100 mg  1 mg/kg Subcutaneous Q12HRS Jeffry Rogel M.D.   100 mg at 05/31/21 1551   • iohexol (OMNIPAQUE) 350 mg/mL  88 mL Intravenous Once Jeffry Rogel M.D.           Allergies  Allergies   Allergen Reactions   • Codeine Vomiting       Vital Signs last 24 hours  Temp:  [36.1 °C (96.9 °F)-36.7 °C (98.1 °F)] 36.7 °C (98.1 °F)  Pulse:  [] 92  Resp:  [13-21] 21  BP: (108-147)/() 135/101  SpO2:  [83 %-97 %] 91 %    Physical Exam  Physical Exam    Fluids    Intake/Output Summary (Last 24 hours) at 5/31/2021 1849  Last data filed at 5/31/2021 1301  Gross per 24 hour   Intake 2461 ml   Output --   Net 2461 ml       Laboratory  Recent Results (from the past 48 hour(s))   LACTIC ACID    Collection Time: 05/30/21 11:18 PM   Result Value Ref Range    Lactic Acid 2.7 (H) 0.5 - 2.0 mmol/L   CBC WITH DIFFERENTIAL    Collection Time: 05/30/21 11:18 PM   Result Value Ref Range    WBC 11.1 (H) 4.8 - 10.8 K/uL    RBC 5.46 4.70 - 6.10 M/uL    Hemoglobin 15.3 14.0 - 18.0 g/dL    Hematocrit 46.9 42.0 - 52.0 %    MCV 85.9 81.4 - 97.8 fL    MCH 28.0 27.0 - 33.0 pg    MCHC 32.6 (L) 33.7 - 35.3 g/dL    RDW 43.8 35.9 - 50.0 fL    Platelet Count 286 164 - 446 K/uL    MPV 10.8 9.0 - 12.9 fL    Neutrophils-Polys 76.70 (H) 44.00 - 72.00 %    Lymphocytes 14.30 (L) 22.00 - 41.00 %    Monocytes  7.70 0.00 - 13.40 %    Eosinophils 0.50 0.00 - 6.90 %    Basophils 0.30 0.00 - 1.80 %    Immature Granulocytes 0.50 0.00 - 0.90 %    Nucleated RBC 0.00 /100 WBC    Neutrophils (Absolute) 8.50 (H) 1.82 - 7.42 K/uL    Lymphs (Absolute) 1.58 1.00 - 4.80 K/uL    Monos (Absolute) 0.85 0.00 - 0.85 K/uL    Eos (Absolute) 0.06 0.00 - 0.51 K/uL    Baso (Absolute) 0.03 0.00 - 0.12 K/uL    Immature Granulocytes (abs) 0.06 0.00 - 0.11 K/uL    NRBC (Absolute) 0.00 K/uL   COMP METABOLIC PANEL    Collection Time: 05/30/21 11:18 PM   Result Value Ref Range    Sodium 138 135 - 145 mmol/L    Potassium 4.3 3.6 - 5.5 mmol/L    Chloride 106 96 - 112 mmol/L    Co2 21 20 - 33 mmol/L    Anion Gap 11.0 7.0 - 16.0    Glucose 123 (H) 65 - 99 mg/dL    Bun 25 (H) 8 - 22 mg/dL    Creatinine 1.39 0.50 - 1.40 mg/dL    Calcium 8.5 8.5 - 10.5 mg/dL    AST(SGOT) 27 12 - 45 U/L    ALT(SGPT) 38 2 - 50 U/L    Alkaline Phosphatase 207 (H) 30 - 99 U/L    Total Bilirubin 0.4 0.1 - 1.5 mg/dL    Albumin 3.9 3.2 - 4.9 g/dL    Total Protein 7.2 6.0 - 8.2 g/dL    Globulin 3.3 1.9 - 3.5 g/dL    A-G Ratio 1.2 g/dL   ESTIMATED GFR    Collection Time: 05/30/21 11:18 PM   Result Value Ref Range    GFR If African American >60 >60 mL/min/1.73 m 2    GFR If Non African American 52 (A) >60 mL/min/1.73 m 2   TROPONIN    Collection Time: 05/30/21 11:18 PM   Result Value Ref Range    Troponin T 41 (H) 6 - 19 ng/L   LACTIC ACID    Collection Time: 05/31/21  2:30 AM   Result Value Ref Range    Lactic Acid 2.0 0.5 - 2.0 mmol/L   URINALYSIS    Collection Time: 05/31/21  3:01 AM    Specimen: Urine   Result Value Ref Range    Color Yellow     Character Cloudy (A)     Specific Gravity 1.021 <1.035    Ph 5.0 5.0 - 8.0    Glucose Negative Negative mg/dL    Ketones Negative Negative mg/dL    Protein 100 (A) Negative mg/dL    Bilirubin Negative Negative    Urobilinogen, Urine 0.2 Negative    Nitrite Positive (A) Negative    Leukocyte Esterase Moderate (A) Negative    Occult Blood  Moderate (A) Negative    Micro Urine Req Microscopic    URINE MICROSCOPIC (W/UA)    Collection Time: 21  3:01 AM   Result Value Ref Range    WBC Packed (A) /hpf    RBC 2-5 (A) /hpf    Bacteria Many (A) None /hpf    Epithelial Cells Negative /hpf    Hyaline Cast 3-5 (A) /lpf   TROPONIN    Collection Time: 21  4:05 AM   Result Value Ref Range    Troponin T 38 (H) 6 - 19 ng/L   EKG    Collection Time: 21  5:25 AM   Result Value Ref Range    Report       [facility]    Test Date:  2021  Pt Name:    MIGUEL ÁNGEL PEREZ               Department: ER  MRN:        [id number]                      Room:       07  Gender:     Male                         Technician: CAT  :        1958                   Requested By:VIKKI TORREZ  Order #:    [id number]                    Reading MD: Alfredo Mccollum MD    Measurements  Intervals                                Axis  Rate:       88                           P:          61  NJ:         152                          QRS:        -18  QRSD:       78                           T:          263  QT:         388  QTc:        470    Interpretive Statements  SINUS RHYTHM  PROBABLE LVH WITH SECONDARY REPOL ABNRM  PROBABLE INFERIOR INFARCT, AGE INDETERMINATE  BASELINE WANDER IN LEAD(S) V1  No previous ECG available for comparison  Electronically Signed On 2021 17:29:50 PDT by Alfredo Mccollum MD     TROPONIN    Collection Time: 21  8:59 AM   Result Value Ref Range    Troponin T 45 (H) 6 - 19 ng/L   EC-ECHOCARDIOGRAM COMPLETE W/O CONT    Collection Time: 21  9:06 AM   Result Value Ref Range    Eject.Frac. MOD BP 56.68     Eject.Frac. MOD 4C 31.02     Eject.Frac. MOD 2C 71.59     Left Ventrical Ejection Fraction 50        Imaging  CT-CTA CHEST PULMONARY ARTERY W/ RECONS   Final Result         Extensive bilateral pulmonary emboli as detailed above with CT findings suggesting right heart strain.         These findings were discussed with ALEXANDER MAXWELL  GIO on 5/31/2021 3:52 PM.            EC-ECHOCARDIOGRAM COMPLETE W/O CONT   Final Result      DX-CHEST-PORTABLE (1 VIEW)   Final Result         No acute cardiac or pulmonary abnormality is identified.      US-EXTREMITY VENOUS LOWER BILAT    (Results Pending)       Assessment/Plan  * NSTEMI (non-ST elevated myocardial infarction) (HCC)  Assessment & Plan  On lovenox sq  Likely secondary to demand for PE and underlying right heart failure/pulmonary htn  Troponin stabilized    Acute pulmonary embolism (HCC)  Assessment & Plan  Acute pe, no hx  Possible trigger long distance driving  Right calf pain  Us lower extremity ordered  CTA with PE, segmental and subsegmental  Does not currently qualify for TPA or EKOS directed therapy  Lovenox subcu  Discussed the need for extended therapy for anticoagulation  If hemodynamically changes then please call back for re-evaluation.    Pulmonary hypertension (HCC)  Assessment & Plan  Likely component of acute on chronic  Body habitus likely component of sleep apnea  Mildly reduced LVEF  No hx of PE  Supportive care as necessary in setting of acute pe and right heart failure, diuresis if hemodynamically tolerates    Obesity (BMI 30-39.9)  Assessment & Plan  Contributory to cardiopulmonary issues    UTI (urinary tract infection)  Assessment & Plan  On antibiotics  Hx of urinary obstruction    Patient does not need ICU management or monitoring currently. If any hemodynamic changes requiring ICU level of care please call back for re-evaluation/assessment.      Discussed patient condition and risk of morbidity and/or mortality with Hospitalist, RN, Pharmacy, Code status disscussed, Charge nurse / hot rounds, Patient and Pulmonary

## 2021-06-01 NOTE — PROGRESS NOTES
Handoff report received from night shift nurse. Pt care assumed. Pt is currently resting in bed. POC discussed with Pt and Pt verbalizes no questions at this time. Pt is AAOx4, on 5.5L NC, on Tele monitoring, and VSS. Call light and belongings within reach, bed in lowest and locked position, and Pt educated on use of call light. Will continue to monitor.

## 2021-06-01 NOTE — CARE PLAN
The patient is Watcher - Medium risk of patient condition declining or worsening    Shift Goals  Clinical Goals: improve and maintain oxygenation  Patient Goals: rest    Progress made toward(s) clinical / shift goals:    Problem: Knowledge Deficit - Standard  Goal: Patient and family/care givers will demonstrate understanding of plan of care, disease process/condition, diagnostic tests and medications  Outcome: Progressing     Problem: Pain - Standard  Goal: Alleviation of pain or a reduction in pain to the patient’s comfort goal  Outcome: Progressing       Patient is not progressing towards the following goals:      Problem: Respiratory  Goal: Patient will achieve/maintain optimum respiratory ventilation and gas exchange  Outcome: Not Progressing  Note: Pt requiring 5.5L of O2 to sat at 95%. Pt has no complaints of SOB.

## 2021-06-02 ENCOUNTER — APPOINTMENT (OUTPATIENT)
Dept: RADIOLOGY | Facility: MEDICAL CENTER | Age: 71
DRG: 280 | End: 2021-06-02
Attending: HOSPITALIST
Payer: MEDICARE

## 2021-06-02 LAB
ANION GAP SERPL CALC-SCNC: 10 MMOL/L (ref 7–16)
BACTERIA UR CULT: ABNORMAL
BACTERIA UR CULT: ABNORMAL
BUN SERPL-MCNC: 19 MG/DL (ref 8–22)
CALCIUM SERPL-MCNC: 8.2 MG/DL (ref 8.5–10.5)
CHLORIDE SERPL-SCNC: 101 MMOL/L (ref 96–112)
CO2 SERPL-SCNC: 26 MMOL/L (ref 20–33)
CREAT SERPL-MCNC: 1.11 MG/DL (ref 0.5–1.4)
ERYTHROCYTE [DISTWIDTH] IN BLOOD BY AUTOMATED COUNT: 42.9 FL (ref 35.9–50)
GLUCOSE SERPL-MCNC: 94 MG/DL (ref 65–99)
HCT VFR BLD AUTO: 40.6 % (ref 42–52)
HGB BLD-MCNC: 13.4 G/DL (ref 14–18)
MCH RBC QN AUTO: 28.2 PG (ref 27–33)
MCHC RBC AUTO-ENTMCNC: 33 G/DL (ref 33.7–35.3)
MCV RBC AUTO: 85.5 FL (ref 81.4–97.8)
PLATELET # BLD AUTO: 243 K/UL (ref 164–446)
PMV BLD AUTO: 10.7 FL (ref 9–12.9)
POTASSIUM SERPL-SCNC: 4.1 MMOL/L (ref 3.6–5.5)
RBC # BLD AUTO: 4.75 M/UL (ref 4.7–6.1)
SIGNIFICANT IND 70042: ABNORMAL
SITE SITE: ABNORMAL
SODIUM SERPL-SCNC: 137 MMOL/L (ref 135–145)
SOURCE SOURCE: ABNORMAL
WBC # BLD AUTO: 8 K/UL (ref 4.8–10.8)

## 2021-06-02 PROCEDURE — 700111 HCHG RX REV CODE 636 W/ 250 OVERRIDE (IP): Performed by: HOSPITALIST

## 2021-06-02 PROCEDURE — 700111 HCHG RX REV CODE 636 W/ 250 OVERRIDE (IP): Performed by: PHARMACIST

## 2021-06-02 PROCEDURE — 36415 COLL VENOUS BLD VENIPUNCTURE: CPT

## 2021-06-02 PROCEDURE — A9270 NON-COVERED ITEM OR SERVICE: HCPCS | Performed by: INTERNAL MEDICINE

## 2021-06-02 PROCEDURE — 99232 SBSQ HOSP IP/OBS MODERATE 35: CPT | Mod: GC | Performed by: INTERNAL MEDICINE

## 2021-06-02 PROCEDURE — 700102 HCHG RX REV CODE 250 W/ 637 OVERRIDE(OP): Performed by: INTERNAL MEDICINE

## 2021-06-02 PROCEDURE — 770020 HCHG ROOM/CARE - TELE (206)

## 2021-06-02 PROCEDURE — 76775 US EXAM ABDO BACK WALL LIM: CPT

## 2021-06-02 PROCEDURE — 85027 COMPLETE CBC AUTOMATED: CPT

## 2021-06-02 PROCEDURE — 700105 HCHG RX REV CODE 258: Performed by: HOSPITALIST

## 2021-06-02 PROCEDURE — 700105 HCHG RX REV CODE 258: Performed by: PHARMACIST

## 2021-06-02 PROCEDURE — 99233 SBSQ HOSP IP/OBS HIGH 50: CPT | Performed by: HOSPITALIST

## 2021-06-02 PROCEDURE — 700111 HCHG RX REV CODE 636 W/ 250 OVERRIDE (IP): Performed by: STUDENT IN AN ORGANIZED HEALTH CARE EDUCATION/TRAINING PROGRAM

## 2021-06-02 PROCEDURE — 80048 BASIC METABOLIC PNL TOTAL CA: CPT

## 2021-06-02 RX ORDER — CALCIUM CARBONATE 500(1250)
500 TABLET ORAL EVERY 6 HOURS PRN
Status: DISCONTINUED | OUTPATIENT
Start: 2021-06-02 | End: 2021-06-07 | Stop reason: HOSPADM

## 2021-06-02 RX ADMIN — PIPERACILLIN AND TAZOBACTAM 4.5 G: 4; .5 INJECTION, POWDER, LYOPHILIZED, FOR SOLUTION INTRAVENOUS; PARENTERAL at 09:57

## 2021-06-02 RX ADMIN — MORPHINE SULFATE 4 MG: 4 INJECTION INTRAVENOUS at 21:02

## 2021-06-02 RX ADMIN — MORPHINE SULFATE 4 MG: 4 INJECTION INTRAVENOUS at 03:18

## 2021-06-02 RX ADMIN — ENOXAPARIN SODIUM 100 MG: 100 INJECTION SUBCUTANEOUS at 03:18

## 2021-06-02 RX ADMIN — PIPERACILLIN AND TAZOBACTAM 4.5 G: 4; .5 INJECTION, POWDER, LYOPHILIZED, FOR SOLUTION INTRAVENOUS; PARENTERAL at 13:31

## 2021-06-02 RX ADMIN — PIPERACILLIN AND TAZOBACTAM 4.5 G: 4; .5 INJECTION, POWDER, LYOPHILIZED, FOR SOLUTION INTRAVENOUS; PARENTERAL at 21:02

## 2021-06-02 RX ADMIN — MORPHINE SULFATE 4 MG: 4 INJECTION INTRAVENOUS at 13:37

## 2021-06-02 RX ADMIN — CALCIUM 500 MG: 500 TABLET ORAL at 05:30

## 2021-06-02 RX ADMIN — CEFTRIAXONE SODIUM 2000 MG: 2 INJECTION, POWDER, FOR SOLUTION INTRAMUSCULAR; INTRAVENOUS at 05:30

## 2021-06-02 RX ADMIN — ENOXAPARIN SODIUM 100 MG: 100 INJECTION SUBCUTANEOUS at 13:31

## 2021-06-02 ASSESSMENT — PAIN DESCRIPTION - PAIN TYPE
TYPE: ACUTE PAIN

## 2021-06-02 NOTE — PROGRESS NOTES
Handoff report received from night shift nurse. Pt care assumed. Pt is currently resting in bed. POC discussed with Pt and Pt verbalizes no questions at this time. Pt is AAOx4, on 4L NC, on Tele monitoring, and VSS. Call light and belongings within reach, bed in lowest and locked position, and Pt educated on use of call light. Will continue to monitor.

## 2021-06-02 NOTE — CODE DOCUMENTATION
Pt initially confused when he woke up from nap, pt now A&O x4, having slight chest pain in left chest, no significant changes noted on EKG. Awaiting troponin result.

## 2021-06-02 NOTE — PROGRESS NOTES
Bedside reports received from day shift RN. Patient A&Ox4. No signs of respiratory distress noted or reported, on 5L NC. Rapid response team at bedside, called for AMS and chest pain. Repeat EKG showed no ST elevation, repeat trop drawn. Pt now A&Ox4, denies chest pain pr shortness of breath. Bed locked in lowest position, 2 side rails up. Call light within reach. Fall precautions in place. Patient reports no additional needs at this time.     1919 Paged to on-call hospitalist.    1935 Spoke with Dr Reynolds, updated on patient's condition and informed RRT called. Pt VSS, denies chest pain or sob, mentation back to baseline. No new orders at this time.

## 2021-06-02 NOTE — PROGRESS NOTES
Paged hospitalist Dr Clifton, updated pt having 9/10 rt sided chest pain. VSS, pt A&Ox4. Denies shortness of breath. No new orders at this time.

## 2021-06-02 NOTE — PROGRESS NOTES
Pulmonary Consultation       Patient ID:   Name:             Howard Quezada     YOB: 1958  Age:                 63 y.o.  male   MRN:               [id number]                                                  Reason of Consult:  Pulmonary embolism/sudden onset dyspnea while [place]    Subjective:  Feels well this morning, not really getting out of bed but up to edge, pleuritic chest pain has significantly reduced, right flank pain is intermittent but not present currently, has pain in his right calf and ankle.  No clinical signs of bleeding, dyspnea has baseline and he is on 4 LPM O2 support currently.    ROS:  Complete ROS was done and was negative except what mentioned in HPI     Past Medical History:  History reviewed. No pertinent past medical history.    Past surgical history:  History reviewed. No pertinent surgical history.    Family History:  History reviewed. No pertinent family history.    Hospital Medications:    Current Facility-Administered Medications:   •  calcium carbonate (OS-) tablet 500 mg, 500 mg, Oral, Q6HRS PRN, Edmundo Clifton M.D., 500 mg at 06/02/21 0530  •  cefTRIAXone (ROCEPHIN) 2,000 mg in  mL IVPB, 2,000 mg, Intravenous, Q24HRS, Sumaya CaiD, Stopped at 06/02/21 0600  •  acetaminophen (Tylenol) tablet 650 mg, 650 mg, Oral, Q6HRS PRN, Christiano Craig M.D., 650 mg at 05/31/21 2005  •  ondansetron (ZOFRAN) syringe/vial injection 4 mg, 4 mg, Intravenous, Q4HRS PRN, Christiano Craig M.D.  •  enoxaparin (LOVENOX) inj 100 mg, 1 mg/kg, Subcutaneous, Q12HRS, Jeffry Rogel M.D., 100 mg at 06/02/21 0318  •  oxyCODONE immediate-release (ROXICODONE) tablet 5 mg, 5 mg, Oral, Q4HRS PRN, Srinath Whitehead M.D.  •  morphine (pf) 4 mg/mL injection 4 mg, 4 mg, Intravenous, Q4HRS PRN, Srinath Whitehead M.D., 4 mg at 06/02/21 0318    Current Outpatient Medications:  No medications prior to admission.       Medication Allergy:  Allergies   Allergen  "Reactions   • Codeine Vomiting             Objective:   Vitals/ General Appearance:   Weight/BMI: Body mass index is 32.72 kg/m².  /91   Pulse 88   Temp 36.2 °C (97.2 °F) (Temporal)   Resp 18   Ht 1.753 m (5' 9\")   Wt 101 kg (221 lb 9 oz)   SpO2 90%   Vitals:    06/01/21 1942 06/02/21 0000 06/02/21 0246 06/02/21 0755   BP: 141/100 145/108 141/94 132/91   Pulse: 72 92 84 88   Resp: 20 18 18 18   Temp: 36.2 °C (97.1 °F) 36.8 °C (98.3 °F)  36.2 °C (97.2 °F)   TempSrc: Temporal   Temporal   SpO2: 93% 95%  90%   Weight: 101 kg (221 lb 9 oz)      Height:         Oxygen Therapy:  Pulse Oximetry: 90 %, O2 (LPM): 4, O2 Delivery Device: Silicone Nasal Cannula    Constitutional:   Well developed, Well nourished, No acute distress  HENMT:  Normocephalic, Atraumatic, Oropharynx moist mucous membranes, No oral exudates, Nose normal.  No thyromegaly.  Eyes:  EOMI, Conjunctiva normal, No discharge.  Neck:  Normal range of motion, No cervical tenderness,  no JVD.  Cardiovascular:  Normal heart rate, Normal rhythm, No murmurs, No rubs, No gallops.   Extremitites with intact distal pulses, no cyanosis, or edema.  Lungs:  Normal breath sounds, breath sounds clear to auscultation bilaterally,  no crackles, no wheezing.   Abdomen: Bowel sounds normal, Soft, tender to palpation over right flank, No guarding, No rebound, No masses, No hepatosplenomegaly.  Skin: Warm, Dry, No erythema, No rash, no induration.  Neurologic: Alert & oriented x 3, No focal deficits noted, cranial nerves II through X are grossly intact.  Psychiatric: Affect normal, Judgment normal, Mood normal.    Labs:  Recent Labs     05/30/21  2318 05/30/21  2318 06/01/21  0230 06/01/21  1638 06/02/21  0350   WBC 11.1*  --  9.1  --  8.0   RBC 5.46  --  5.17  --  4.75   HEMOGLOBIN 15.3   < > 14.5 13.9* 13.4*   HEMATOCRIT 46.9   < > 44.7 43.1 40.6*   MCV 85.9  --  86.5  --  85.5   MCH 28.0  --  28.0  --  28.2   MCHC 32.6*  --  32.4*  --  33.0*   RDW 43.8  --  43.5  " --  42.9   PLATELETCT 286  --  239  --  243   MPV 10.8  --  10.1  --  10.7    < > = values in this interval not displayed.                 Recent Labs     05/30/21 2318 06/01/21  0230 06/02/21  0350   SODIUM 138 135 137   POTASSIUM 4.3 4.6 4.1   CHLORIDE 106 102 101   CO2 21 24 26   GLUCOSE 123* 109* 94   BUN 25* 21 19     Recent Labs     05/30/21 2318 06/01/21  0230 06/02/21  0350   SODIUM 138 135 137   POTASSIUM 4.3 4.6 4.1   CHLORIDE 106 102 101   CO2 21 24 26   BUN 25* 21 19   CREATININE 1.39 1.36 1.11   CALCIUM 8.5 8.5 8.2*     No results found for this or any previous visit.      Imaging:   US-RENAL   Final Result      Unremarkable kidneys. No hydronephrosis.      US-EXTREMITY VENOUS LOWER BILAT   Final Result      CT-CTA CHEST PULMONARY ARTERY W/ RECONS   Final Result         Extensive bilateral pulmonary emboli as detailed above with CT findings suggesting right heart strain.         These findings were discussed with ALEXANDER POWERS on 5/31/2021 3:52 PM.            EC-ECHOCARDIOGRAM COMPLETE W/O CONT   Final Result      DX-CHEST-PORTABLE (1 VIEW)   Final Result         No acute cardiac or pulmonary abnormality is identified.              Assessment and Plan:    71-year-old male with provoked DVT right lower extremity, with multiple segmental and subsegmental pulmonary embolisms, hypoxic to 4 LPM O2 support, with TTE evidence of pulmonary hypertension beyond expected with simple PE, D-shaped septum and right heart strain, not tachycardic/hypotensive, treated for NSTEMI and on full dose anticoagulation.  Improving hypoxia.    sPESI high risk, 8.9% mortality.    -Continue oxygen support as needed  -Switch Lovenox to NOAC, discussed with primary Dr. Kent  -No indication for EKOS/ tPA at this time given intermediate severity of high-grade pulmonary embolism, risk of hemorrhage greater than benefit from EKOS/ tPA given his severity of PE burden  -Pulmonology signing off, please contact us with any additional  questions          Principal Problem:    NSTEMI (non-ST elevated myocardial infarction) (HCC) POA: Unknown  Active Problems:    UTI (urinary tract infection) POA: Unknown    Obesity (BMI 30-39.9) POA: Unknown    Pulmonary hypertension (HCC) POA: Unknown    Acute pulmonary embolism with acute cor pulmonale (HCC) POA: Unknown  Resolved Problems:    * No resolved hospital problems. *

## 2021-06-02 NOTE — PROGRESS NOTES
Called Rapid Response for patient since Patient woke up AAOX1, disoriented to place, time, and event. Patient also started complaining of left sided chest pain. EKG ordered which showed no ST elevation, and VSS. After approximately 20 minutes patient regained mental status and is AAOX4. Troponin ordered.

## 2021-06-02 NOTE — CARE PLAN
Problem: Knowledge Deficit - Standard  Goal: Patient and family/care givers will demonstrate understanding of plan of care, disease process/condition, diagnostic tests and medications  Outcome: Progressing     Problem: Fall Risk  Goal: Patient will remain free from falls  Outcome: Progressing     The patient is Watcher - Medium risk of patient condition declining or worsening    Shift Goals  Clinical Goals: maintain oxygen saturation and get US venous done  Patient Goals: rest

## 2021-06-02 NOTE — PROGRESS NOTES
ISOLATION PRECAUTIONS EDUCATION    Educated PATIENT, FAMILY, S.O: patient on isolation for ESBL.    Educated on reason for isolation, how the infection may be transmitted, and how to help prevent transmission to others. Educated precautions involves staff and visitors wearing PPE, following Standard Precautions and performing meticulous hand hygiene in order to prevent transmission of infection.     Contact Precautions: Educated that Contact Precautions involves staff and visitors wearing gowns and gloves when in the patient room.     In addition, educated that the patient may leave the room, but prior to exiting the patient room each time, the patient needs to have on a fresh patient gown, ensure the potentially infectious area is covered, and perform hand hygiene with soap and water or alcohol-based hand rub, immediately prior to exiting the room.       Patient transport and mobilization on unit  Educated that they may leave their room, but prior to exiting, the patient needs to have on a fresh patient gown, ensure the potentially infectious area is covered, performing appropriate hand hygiene immediately prior to exiting the room.

## 2021-06-03 LAB
ANION GAP SERPL CALC-SCNC: 10 MMOL/L (ref 7–16)
BUN SERPL-MCNC: 15 MG/DL (ref 8–22)
CALCIUM SERPL-MCNC: 8.1 MG/DL (ref 8.5–10.5)
CHLORIDE SERPL-SCNC: 103 MMOL/L (ref 96–112)
CO2 SERPL-SCNC: 23 MMOL/L (ref 20–33)
CREAT SERPL-MCNC: 1.12 MG/DL (ref 0.5–1.4)
ERYTHROCYTE [DISTWIDTH] IN BLOOD BY AUTOMATED COUNT: 42 FL (ref 35.9–50)
GLUCOSE SERPL-MCNC: 133 MG/DL (ref 65–99)
HCT VFR BLD AUTO: 40.9 % (ref 42–52)
HGB BLD-MCNC: 13.2 G/DL (ref 14–18)
MCH RBC QN AUTO: 27.7 PG (ref 27–33)
MCHC RBC AUTO-ENTMCNC: 32.3 G/DL (ref 33.7–35.3)
MCV RBC AUTO: 85.9 FL (ref 81.4–97.8)
PLATELET # BLD AUTO: 235 K/UL (ref 164–446)
PMV BLD AUTO: 10.7 FL (ref 9–12.9)
POTASSIUM SERPL-SCNC: 4.1 MMOL/L (ref 3.6–5.5)
RBC # BLD AUTO: 4.76 M/UL (ref 4.7–6.1)
SODIUM SERPL-SCNC: 136 MMOL/L (ref 135–145)
WBC # BLD AUTO: 6.9 K/UL (ref 4.8–10.8)

## 2021-06-03 PROCEDURE — 80048 BASIC METABOLIC PNL TOTAL CA: CPT

## 2021-06-03 PROCEDURE — 99233 SBSQ HOSP IP/OBS HIGH 50: CPT | Performed by: HOSPITALIST

## 2021-06-03 PROCEDURE — A9270 NON-COVERED ITEM OR SERVICE: HCPCS | Performed by: STUDENT IN AN ORGANIZED HEALTH CARE EDUCATION/TRAINING PROGRAM

## 2021-06-03 PROCEDURE — 700102 HCHG RX REV CODE 250 W/ 637 OVERRIDE(OP): Performed by: STUDENT IN AN ORGANIZED HEALTH CARE EDUCATION/TRAINING PROGRAM

## 2021-06-03 PROCEDURE — 700105 HCHG RX REV CODE 258: Performed by: HOSPITALIST

## 2021-06-03 PROCEDURE — 770020 HCHG ROOM/CARE - TELE (206)

## 2021-06-03 PROCEDURE — 36415 COLL VENOUS BLD VENIPUNCTURE: CPT

## 2021-06-03 PROCEDURE — A9270 NON-COVERED ITEM OR SERVICE: HCPCS | Performed by: HOSPITALIST

## 2021-06-03 PROCEDURE — 700111 HCHG RX REV CODE 636 W/ 250 OVERRIDE (IP): Performed by: HOSPITALIST

## 2021-06-03 PROCEDURE — 700102 HCHG RX REV CODE 250 W/ 637 OVERRIDE(OP): Performed by: HOSPITALIST

## 2021-06-03 PROCEDURE — 85027 COMPLETE CBC AUTOMATED: CPT

## 2021-06-03 RX ADMIN — APIXABAN 10 MG: 5 TABLET, FILM COATED ORAL at 12:11

## 2021-06-03 RX ADMIN — PIPERACILLIN AND TAZOBACTAM 4.5 G: 4; .5 INJECTION, POWDER, LYOPHILIZED, FOR SOLUTION INTRAVENOUS; PARENTERAL at 12:08

## 2021-06-03 RX ADMIN — OXYCODONE 5 MG: 5 TABLET ORAL at 13:24

## 2021-06-03 RX ADMIN — PIPERACILLIN AND TAZOBACTAM 4.5 G: 4; .5 INJECTION, POWDER, LYOPHILIZED, FOR SOLUTION INTRAVENOUS; PARENTERAL at 20:26

## 2021-06-03 RX ADMIN — PIPERACILLIN AND TAZOBACTAM 4.5 G: 4; .5 INJECTION, POWDER, LYOPHILIZED, FOR SOLUTION INTRAVENOUS; PARENTERAL at 04:30

## 2021-06-03 RX ADMIN — APIXABAN 10 MG: 5 TABLET, FILM COATED ORAL at 01:17

## 2021-06-03 RX ADMIN — OXYCODONE 5 MG: 5 TABLET ORAL at 20:25

## 2021-06-03 ASSESSMENT — PAIN DESCRIPTION - PAIN TYPE
TYPE: ACUTE PAIN

## 2021-06-03 ASSESSMENT — FIBROSIS 4 INDEX: FIB4 SCORE: 1.32

## 2021-06-03 NOTE — CARE PLAN
The patient is Stable - Low risk of patient condition declining or worsening    Shift Goals  Clinical Goals: Wean patient oxygen to below 4L NC  Patient Goals: rest    Progress made toward(s) clinical / shift goals:  NA    Patient is not progressing towards the following goals:      Problem: Respiratory  Goal: Patient will achieve/maintain optimum respiratory ventilation and gas exchange  Outcome: Not Progressing   Patient is still on 4L and desaturates when trying to wean O2

## 2021-06-03 NOTE — CARE PLAN
Problem: Knowledge Deficit - Standard  Goal: Patient and family/care givers will demonstrate understanding of plan of care, disease process/condition, diagnostic tests and medications  Outcome: Progressing     Problem: Fall Risk  Goal: Patient will remain free from falls  Outcome: Progressing     The patient is Stable - Low risk of patient condition declining or worsening    Shift Goals  Clinical Goals: Wean patient oxygen to below 4L NC  Patient Goals: rest

## 2021-06-03 NOTE — PROGRESS NOTES
Hospital Medicine Daily Progress Note    Date of Service  6/3/2021    Chief Complaint  71 y.o. male admitted 5/30/2021 with chest discomfort found to have extensive pulmonary emboli on CTA.    Hospital Course  No notes on file    Interval Problem Update  No acute overnight events.  Patient reports no interval worsening in his respiratory symptoms.  No chest discomfort.  Tolerating anticoagulation well without any bleeds.    Consultants/Specialty  Assistance of Dr. Booth from pulmonary medicine greatly appreciated.    Code Status  Full Code    Disposition  Pending at this juncture.    Review of Systems  All systems reviewed and negative except as noted per above.    Physical Exam  Temp:  [36.1 °C (97 °F)-37.2 °C (98.9 °F)] 36.6 °C (97.8 °F)  Pulse:  [83-88] 86  Resp:  [16-18] 18  BP: (128-138)/(95-99) 138/98  SpO2:  [90 %-98 %] 94 %    General: No acute distress  HEENT atraumatic, normocephalic, pupils equal round reactive to light  Neck: No JVD  Chest: Respirations are unlabored  Cardiac: Physiologic S1 and S2  Abdomen: Soft, nontender, nondistended  Extremities: Without clubbing, cyanosis or edema  Neuro: Cranial nerves II through XII are grossly intact.  Psych: No anxiety, judgement intact.        Current Facility-Administered Medications:   •  calcium carbonate (OS-) tablet 500 mg, 500 mg, Oral, Q6HRS PRN, Edmundo Clifton M.D., 500 mg at 06/02/21 0530  •  [COMPLETED] piperacillin-tazobactam (ZOSYN) 4.5 g in  mL IVPB, 4.5 g, Intravenous, Once, Stopped at 06/02/21 1027 **AND** piperacillin-tazobactam (ZOSYN) 4.5 g in  mL IVPB, 4.5 g, Intravenous, Q8HRS, Srinath Kent M.D., Last Rate: 25 mL/hr at 06/03/21 1208, 4.5 g at 06/03/21 1208  •  apixaban (ELIQUIS) tablet 10 mg, 10 mg, Oral, BID, 10 mg at 06/03/21 1211 **FOLLOWED BY** [START ON 6/10/2021] apixaban (ELIQUIS) tablet 5 mg, 5 mg, Oral, BID, Srinath Kent M.D.  •  acetaminophen (Tylenol) tablet 650 mg, 650 mg, Oral, Q6HRS PRN, Christiano Mccabe  SAM Craig, 650 mg at 05/31/21 2005  •  ondansetron (ZOFRAN) syringe/vial injection 4 mg, 4 mg, Intravenous, Q4HRS PRN, Christiano Craig M.D.  •  oxyCODONE immediate-release (ROXICODONE) tablet 5 mg, 5 mg, Oral, Q4HRS PRN, Srinath Whitehead M.D.  •  morphine (pf) 4 mg/mL injection 4 mg, 4 mg, Intravenous, Q4HRS PRN, Srinath Whitehead M.D., 4 mg at 06/02/21 2102      Fluids    Intake/Output Summary (Last 24 hours) at 6/3/2021 1230  Last data filed at 6/3/2021 1100  Gross per 24 hour   Intake 240 ml   Output 1850 ml   Net -1610 ml       Laboratory  Recent Labs     06/01/21  0230 06/01/21  0230 06/01/21  1638 06/02/21  0350 06/03/21  0231   WBC 9.1  --   --  8.0 6.9   RBC 5.17  --   --  4.75 4.76   HEMOGLOBIN 14.5   < > 13.9* 13.4* 13.2*   HEMATOCRIT 44.7   < > 43.1 40.6* 40.9*   MCV 86.5  --   --  85.5 85.9   MCH 28.0  --   --  28.2 27.7   MCHC 32.4*  --   --  33.0* 32.3*   RDW 43.5  --   --  42.9 42.0   PLATELETCT 239  --   --  243 235   MPV 10.1  --   --  10.7 10.7    < > = values in this interval not displayed.     Recent Labs     06/01/21  0230 06/02/21  0350 06/03/21  0231   SODIUM 135 137 136   POTASSIUM 4.6 4.1 4.1   CHLORIDE 102 101 103   CO2 24 26 23   GLUCOSE 109* 94 133*   BUN 21 19 15   CREATININE 1.36 1.11 1.12   CALCIUM 8.5 8.2* 8.1*                   Imaging  US-RENAL   Final Result      Unremarkable kidneys. No hydronephrosis.      US-EXTREMITY VENOUS LOWER BILAT   Final Result      CT-CTA CHEST PULMONARY ARTERY W/ RECONS   Final Result         Extensive bilateral pulmonary emboli as detailed above with CT findings suggesting right heart strain.         These findings were discussed with ALEXANDER POWERS on 5/31/2021 3:52 PM.            EC-ECHOCARDIOGRAM COMPLETE W/O CONT   Final Result      DX-CHEST-PORTABLE (1 VIEW)   Final Result         No acute cardiac or pulmonary abnormality is identified.        Results     Procedure Component Value Units Date/Time    URINE CULTURE(NEW) [[id number]]  (Abnormal)   (Susceptibility) Collected: 05/31/21 0301    Order Status: Completed Specimen: Urine Updated: 06/02/21 0802     Significant Indicator POS     Source UR     Site -     Culture Result -      Escherichia coli ESBL  >100,000 cfu/mL  Extended Spectrum Beta-lactamase (ESBL) isolated.  ESBL's may be clinically resistant to therapy with  Penicillins,Cephalosporins or Aztreonam despite  apparent in vitro susceptibility to some of these agents.  The patient requires contact isolation.  Please contact pharmacy or an Infectious Disease Specialist  if you have any questions about appropriate therapy.      Narrative:      CALL  Marte  171 tel. [phone],  CALLED  171 tel. [phone] 06/02/2021, 08:02, RB PERF. RESULTS CALLED  TO:[id number] RN  Indication for culture:->Emergency Room Patient  Indication for culture:->Emergency Room Patient    Susceptibility     Escherichia coli esbl (1)     Antibiotic Interpretation Microscan Method Status    Ampicillin Resistant >16 mcg/mL CORDELIA Final    Ceftriaxone Extended Spectrum Beta Lactamase >32 mcg/mL CORDELIA Final    Cefazolin Resistant >16 mcg/mL CORDELIA Final    Ciprofloxacin Resistant >2 mcg/mL CORDELIA Final    Cefepime Resistant >16 mcg/mL CORDELIA Final    Cefuroxime Resistant >16 mcg/mL CORDELIA Final    Tobramycin Sensitive <=2 mcg/mL CORDELIA Final    Nitrofurantoin Resistant >64 mcg/mL CORDELIA Final    Gentamicin Sensitive <=2 mcg/mL CORDELIA Final    Levofloxacin Resistant >4 mcg/mL CORDELIA Final    Pip/Tazobactam Sensitive <=8 mcg/mL CORDELIA Final    Trimeth/Sulfa Sensitive <=0.5/9.5 mcg/mL CORDELIA Final    Tigecycline Sensitive <=2 mcg/mL CORDELIA Final                   BLOOD CULTURE [[id number]] Collected: 05/30/21 2319    Order Status: Completed Specimen: Blood from Peripheral Updated: 06/01/21 0811     Significant Indicator NEG     Source BLD     Site PERIPHERAL     Culture Result No Growth  Note: Blood cultures are incubated for 5 days and  are monitored continuously.Positive blood cultures  are called to the RN and reported as  "soon as  they are identified.      Narrative:      Per Hospital Policy: Only change Specimen Src: to \"Line\" if  specified by physician order.  No site indicated    BLOOD CULTURE [[id number]] Collected: 05/30/21 2319    Order Status: Completed Specimen: Blood from Peripheral Updated: 06/01/21 0811     Significant Indicator NEG     Source BLD     Site PERIPHERAL     Culture Result No Growth  Note: Blood cultures are incubated for 5 days and  are monitored continuously.Positive blood cultures  are called to the RN and reported as soon as  they are identified.      Narrative:      Per Hospital Policy: Only change Specimen Src: to \"Line\" if  specified by physician order.  No site indicated    URINALYSIS [[id number]]  (Abnormal) Collected: 05/31/21 0301    Order Status: Completed Specimen: Urine Updated: 05/31/21 0336     Color Yellow     Character Cloudy     Specific Gravity 1.021     Ph 5.0     Glucose Negative mg/dL      Ketones Negative mg/dL      Protein 100 mg/dL      Bilirubin Negative     Urobilinogen, Urine 0.2     Nitrite Positive     Leukocyte Esterase Moderate     Occult Blood Moderate     Micro Urine Req Microscopic    Narrative:      Indication for culture:->Emergency Room Patient               Assessment/Plan  * NSTEMI (non-ST elevated myocardial infarction) (HCC)  Assessment & Plan  Admit patient to  telemetry unit  Initial troponin 41 -> 38, likely demand ischemia from large PE.    EKG - NSR w/ non specific T wave changes in lateral leads  Echocardiogram      Acute pulmonary embolism with acute cor pulmonale (HCC)  Assessment & Plan  Pulmonary following along, can consider catheter-based interventions at their discretion.    Continue anticoagulation as ordered (LMWH to [facility] on 6/2).      Obesity (BMI 30-39.9)  Assessment & Plan  15 minutes spent counseling patient on benefits of nutrition and healthy lifestyle    UTI (urinary tract infection)  Assessment & Plan  Admit patient to medical floor  Given " rocephin 2 g q24, continue   Urine culture w/ ESBL, ABX per antimicorbial stewardship.    Blood cultures NGTD.   Lactic acid elevated initially, trended to normal         VTE prophylaxis: LMWH

## 2021-06-03 NOTE — CARE PLAN
The patient is Stable - Low risk of patient condition declining or worsening    Shift Goals  Clinical Goals: wean oxygen saturation below 4L  Patient Goals: rest    Progress made toward(s) clinical / shift goals:  patient is now on 3L NC    Patient is not progressing towards the following goals:

## 2021-06-04 LAB
ANION GAP SERPL CALC-SCNC: 7 MMOL/L (ref 7–16)
BUN SERPL-MCNC: 12 MG/DL (ref 8–22)
CALCIUM SERPL-MCNC: 8.8 MG/DL (ref 8.5–10.5)
CHLORIDE SERPL-SCNC: 103 MMOL/L (ref 96–112)
CO2 SERPL-SCNC: 30 MMOL/L (ref 20–33)
CREAT SERPL-MCNC: 1.05 MG/DL (ref 0.5–1.4)
ERYTHROCYTE [DISTWIDTH] IN BLOOD BY AUTOMATED COUNT: 42.9 FL (ref 35.9–50)
GLUCOSE SERPL-MCNC: 91 MG/DL (ref 65–99)
HCT VFR BLD AUTO: 43.7 % (ref 42–52)
HGB BLD-MCNC: 14.2 G/DL (ref 14–18)
MCH RBC QN AUTO: 28.2 PG (ref 27–33)
MCHC RBC AUTO-ENTMCNC: 32.5 G/DL (ref 33.7–35.3)
MCV RBC AUTO: 86.7 FL (ref 81.4–97.8)
PLATELET # BLD AUTO: 279 K/UL (ref 164–446)
PMV BLD AUTO: 10.1 FL (ref 9–12.9)
POTASSIUM SERPL-SCNC: 4.8 MMOL/L (ref 3.6–5.5)
RBC # BLD AUTO: 5.04 M/UL (ref 4.7–6.1)
SODIUM SERPL-SCNC: 140 MMOL/L (ref 135–145)
WBC # BLD AUTO: 7.8 K/UL (ref 4.8–10.8)

## 2021-06-04 PROCEDURE — 85027 COMPLETE CBC AUTOMATED: CPT

## 2021-06-04 PROCEDURE — 700105 HCHG RX REV CODE 258: Performed by: HOSPITALIST

## 2021-06-04 PROCEDURE — 770001 HCHG ROOM/CARE - MED/SURG/GYN PRIV*

## 2021-06-04 PROCEDURE — 700102 HCHG RX REV CODE 250 W/ 637 OVERRIDE(OP): Performed by: HOSPITALIST

## 2021-06-04 PROCEDURE — A9270 NON-COVERED ITEM OR SERVICE: HCPCS | Performed by: STUDENT IN AN ORGANIZED HEALTH CARE EDUCATION/TRAINING PROGRAM

## 2021-06-04 PROCEDURE — 36415 COLL VENOUS BLD VENIPUNCTURE: CPT

## 2021-06-04 PROCEDURE — A9270 NON-COVERED ITEM OR SERVICE: HCPCS | Performed by: HOSPITALIST

## 2021-06-04 PROCEDURE — 99232 SBSQ HOSP IP/OBS MODERATE 35: CPT | Performed by: HOSPITALIST

## 2021-06-04 PROCEDURE — 700111 HCHG RX REV CODE 636 W/ 250 OVERRIDE (IP): Performed by: HOSPITALIST

## 2021-06-04 PROCEDURE — 80048 BASIC METABOLIC PNL TOTAL CA: CPT

## 2021-06-04 PROCEDURE — 700102 HCHG RX REV CODE 250 W/ 637 OVERRIDE(OP): Performed by: STUDENT IN AN ORGANIZED HEALTH CARE EDUCATION/TRAINING PROGRAM

## 2021-06-04 RX ADMIN — OXYCODONE 5 MG: 5 TABLET ORAL at 00:09

## 2021-06-04 RX ADMIN — PIPERACILLIN AND TAZOBACTAM 4.5 G: 4; .5 INJECTION, POWDER, LYOPHILIZED, FOR SOLUTION INTRAVENOUS; PARENTERAL at 20:04

## 2021-06-04 RX ADMIN — PIPERACILLIN AND TAZOBACTAM 4.5 G: 4; .5 INJECTION, POWDER, LYOPHILIZED, FOR SOLUTION INTRAVENOUS; PARENTERAL at 14:02

## 2021-06-04 RX ADMIN — APIXABAN 10 MG: 5 TABLET, FILM COATED ORAL at 14:01

## 2021-06-04 RX ADMIN — OXYCODONE 5 MG: 5 TABLET ORAL at 04:17

## 2021-06-04 RX ADMIN — OXYCODONE 5 MG: 5 TABLET ORAL at 20:04

## 2021-06-04 RX ADMIN — APIXABAN 10 MG: 5 TABLET, FILM COATED ORAL at 00:09

## 2021-06-04 RX ADMIN — PIPERACILLIN AND TAZOBACTAM 4.5 G: 4; .5 INJECTION, POWDER, LYOPHILIZED, FOR SOLUTION INTRAVENOUS; PARENTERAL at 04:15

## 2021-06-04 ASSESSMENT — PAIN DESCRIPTION - PAIN TYPE
TYPE: ACUTE PAIN

## 2021-06-04 NOTE — PROGRESS NOTES
Received evening report from day RN. Pt is back from sitting on the toilet, and walks successfully feels comfortable. Bed is locked in low position with call light and belongings within reach. POC discussed and all questions answered. All needs and requirements met at this time.

## 2021-06-04 NOTE — CARE PLAN
The patient is Stable - Low risk of patient condition declining or worsening    Shift Goals  Clinical Goals: wean oxygen  Patient Goals: ambulate    Progress made toward(s) clinical / shift goals:  ambulated in hallway    Patient is not progressing towards the following goals: Patient is still on 3L O2.      Problem: Knowledge Deficit - Standard  Goal: Patient and family/care givers will demonstrate understanding of plan of care, disease process/condition, diagnostic tests and medications  Outcome: Progressing     Problem: Fall Risk  Goal: Patient will remain free from falls  Outcome: Progressing     Problem: Pain - Standard  Goal: Alleviation of pain or a reduction in pain to the patient’s comfort goal  Outcome: Progressing     Problem: Respiratory  Goal: Patient will achieve/maintain optimum respiratory ventilation and gas exchange  Outcome: Progressing  Note: On 3L O2, maintaining sats >90%.

## 2021-06-04 NOTE — PROGRESS NOTES
Received bedside report from RN, pt care assumed, VSS, pt assessment complete. Pt AAOx4, with no complaint of pain at this time. No signs of acute distress noted at this time. Plan of care discussed with pt and verbalizes no questions. Pt denies any additional needs at this time. Bed locked/in lowest position, bed alarm on, pt educated on fall risk and verbalized understanding, call light within reach, hourly rounding initiated.

## 2021-06-04 NOTE — PROGRESS NOTES
Hospital Medicine Daily Progress Note    Date of Service  6/4/2021    Chief Complaint  71 y.o. male admitted 5/30/2021 with chest discomfort found to have extensive pulmonary emboli on CTA.    Hospital Course  No notes on file    Interval Problem Update  No acute overnight events.  Patient reports no interval worsening in his respiratory symptoms.  No chest discomfort.  Tolerating anticoagulation well without any bleeds.    Consultants/Specialty  Assistance of Dr. Booth from pulmonary medicine greatly appreciated.    Code Status  Full Code    Disposition  Pending at this juncture.    Review of Systems  All systems reviewed and negative except as noted per above.    Physical Exam  Temp:  [36 °C (96.8 °F)-36.7 °C (98.1 °F)] 36.2 °C (97.2 °F)  Pulse:  [84-93] 93  Resp:  [18-20] 18  BP: (139-168)/() 144/97  SpO2:  [90 %-96 %] 95 %    General: No acute distress  HEENT atraumatic, normocephalic, pupils equal round reactive to light  Neck: No JVD  Chest: Respirations are unlabored  Cardiac: Physiologic S1 and S2  Abdomen: Soft, nontender, nondistended  Extremities: Without clubbing, cyanosis or edema  Neuro: Cranial nerves II through XII are grossly intact.  Psych: No anxiety, judgement intact.        Current Facility-Administered Medications:   •  calcium carbonate (OS-) tablet 500 mg, 500 mg, Oral, Q6HRS PRN, Edmundo Clifton M.D., 500 mg at 06/02/21 0530  •  [COMPLETED] piperacillin-tazobactam (ZOSYN) 4.5 g in  mL IVPB, 4.5 g, Intravenous, Once, Stopped at 06/02/21 1027 **AND** piperacillin-tazobactam (ZOSYN) 4.5 g in  mL IVPB, 4.5 g, Intravenous, Q8HRS, Srinath Kent M.D., Stopped at 06/04/21 0815  •  apixaban (ELIQUIS) tablet 10 mg, 10 mg, Oral, BID, 10 mg at 06/04/21 0009 **FOLLOWED BY** [START ON 6/10/2021] apixaban (ELIQUIS) tablet 5 mg, 5 mg, Oral, BID, Srinath Kent M.D.  •  acetaminophen (Tylenol) tablet 650 mg, 650 mg, Oral, Q6HRS PRN, Christiano Craig M.D., 650 mg at 05/31/21  2005  •  ondansetron (ZOFRAN) syringe/vial injection 4 mg, 4 mg, Intravenous, Q4HRS PRN, Christiano Cragi M.D.  •  oxyCODONE immediate-release (ROXICODONE) tablet 5 mg, 5 mg, Oral, Q4HRS PRN, Srinath Whitehead M.D., 5 mg at 06/04/21 0417  •  morphine (pf) 4 mg/mL injection 4 mg, 4 mg, Intravenous, Q4HRS PRN, Srinath Whitehead M.D., 4 mg at 06/02/21 2102      Fluids    Intake/Output Summary (Last 24 hours) at 6/4/2021 1300  Last data filed at 6/4/2021 0830  Gross per 24 hour   Intake 240 ml   Output 1750 ml   Net -1510 ml       Laboratory  Recent Labs     06/02/21  0350 06/03/21  0231 06/04/21  0611   WBC 8.0 6.9 7.8   RBC 4.75 4.76 5.04   HEMOGLOBIN 13.4* 13.2* 14.2   HEMATOCRIT 40.6* 40.9* 43.7   MCV 85.5 85.9 86.7   MCH 28.2 27.7 28.2   MCHC 33.0* 32.3* 32.5*   RDW 42.9 42.0 42.9   PLATELETCT 243 235 279   MPV 10.7 10.7 10.1     Recent Labs     06/02/21  0350 06/03/21  0231 06/04/21  0611   SODIUM 137 136 140   POTASSIUM 4.1 4.1 4.8   CHLORIDE 101 103 103   CO2 26 23 30   GLUCOSE 94 133* 91   BUN 19 15 12   CREATININE 1.11 1.12 1.05   CALCIUM 8.2* 8.1* 8.8                   Imaging  US-RENAL   Final Result      Unremarkable kidneys. No hydronephrosis.      US-EXTREMITY VENOUS LOWER BILAT   Final Result      CT-CTA CHEST PULMONARY ARTERY W/ RECONS   Final Result         Extensive bilateral pulmonary emboli as detailed above with CT findings suggesting right heart strain.         These findings were discussed with ALEXANDER POWERS on 5/31/2021 3:52 PM.            EC-ECHOCARDIOGRAM COMPLETE W/O CONT   Final Result      DX-CHEST-PORTABLE (1 VIEW)   Final Result         No acute cardiac or pulmonary abnormality is identified.        Results     Procedure Component Value Units Date/Time    URINE CULTURE(NEW) [[id number]]  (Abnormal)  (Susceptibility) Collected: 05/31/21 0301    Order Status: Completed Specimen: Urine Updated: 06/02/21 0802     Significant Indicator POS     Source UR     Site -     Culture Result -       "Escherichia coli ESBL  >100,000 cfu/mL  Extended Spectrum Beta-lactamase (ESBL) isolated.  ESBL's may be clinically resistant to therapy with  Penicillins,Cephalosporins or Aztreonam despite  apparent in vitro susceptibility to some of these agents.  The patient requires contact isolation.  Please contact pharmacy or an Infectious Disease Specialist  if you have any questions about appropriate therapy.      Narrative:      CALL  Marte  171 tel. [phone],  CALLED  171 tel. [phone] 06/02/2021, 08:02, RB PERF. RESULTS CALLED  TO:[id number] RN  Indication for culture:->Emergency Room Patient  Indication for culture:->Emergency Room Patient    Susceptibility     Escherichia coli esbl (1)     Antibiotic Interpretation Microscan Method Status    Ampicillin Resistant >16 mcg/mL CORDELIA Final    Ceftriaxone Extended Spectrum Beta Lactamase >32 mcg/mL CORDELIA Final    Cefazolin Resistant >16 mcg/mL CORDELIA Final    Ciprofloxacin Resistant >2 mcg/mL CORDELIA Final    Cefepime Resistant >16 mcg/mL CORDELIA Final    Cefuroxime Resistant >16 mcg/mL CORDELIA Final    Tobramycin Sensitive <=2 mcg/mL CORDELIA Final    Nitrofurantoin Resistant >64 mcg/mL CORDELIA Final    Gentamicin Sensitive <=2 mcg/mL CORDELIA Final    Levofloxacin Resistant >4 mcg/mL CORDELIA Final    Pip/Tazobactam Sensitive <=8 mcg/mL CORDELIA Final    Trimeth/Sulfa Sensitive <=0.5/9.5 mcg/mL CORDELIA Final    Tigecycline Sensitive <=2 mcg/mL CORDELIA Final                   BLOOD CULTURE [[id number]] Collected: 05/30/21 2319    Order Status: Completed Specimen: Blood from Peripheral Updated: 06/01/21 0811     Significant Indicator NEG     Source BLD     Site PERIPHERAL     Culture Result No Growth  Note: Blood cultures are incubated for 5 days and  are monitored continuously.Positive blood cultures  are called to the RN and reported as soon as  they are identified.      Narrative:      Per Hospital Policy: Only change Specimen Src: to \"Line\" if  specified by physician order.  No site indicated    BLOOD CULTURE [[id number]] " "Collected: 05/30/21 1279    Order Status: Completed Specimen: Blood from Peripheral Updated: 06/01/21 0811     Significant Indicator NEG     Source BLD     Site PERIPHERAL     Culture Result No Growth  Note: Blood cultures are incubated for 5 days and  are monitored continuously.Positive blood cultures  are called to the RN and reported as soon as  they are identified.      Narrative:      Per Hospital Policy: Only change Specimen Src: to \"Line\" if  specified by physician order.  No site indicated    URINALYSIS [[id number]]  (Abnormal) Collected: 05/31/21 0301    Order Status: Completed Specimen: Urine Updated: 05/31/21 0336     Color Yellow     Character Cloudy     Specific Gravity 1.021     Ph 5.0     Glucose Negative mg/dL      Ketones Negative mg/dL      Protein 100 mg/dL      Bilirubin Negative     Urobilinogen, Urine 0.2     Nitrite Positive     Leukocyte Esterase Moderate     Occult Blood Moderate     Micro Urine Req Microscopic    Narrative:      Indication for culture:->Emergency Room Patient               Assessment/Plan  * NSTEMI (non-ST elevated myocardial infarction) ([facility])  Assessment & Plan  Admit patient to  telemetry unit  Initial troponin 41 -> 38, likely demand ischemia from large PE.    EKG - NSR w/ non specific T wave changes in lateral leads  Echocardiogram      Acute pulmonary embolism with acute cor pulmonale (HCC)  Assessment & Plan  Pulmonary following along, can consider catheter-based interventions at their discretion.    Continue anticoagulation as ordered (LMWH to [facility] on 6/2).      Obesity (BMI 30-39.9)  Assessment & Plan  15 minutes spent counseling patient on benefits of nutrition and healthy lifestyle    UTI (urinary tract infection)  Assessment & Plan  Admit patient to medical floor  Urine culture w/ ESBL, ABX per antimicorbial stewardship.    Blood cultures NGTD. Compled 5 days therapy on 6/5  Lactic acid elevated initially, trended to normal         VTE prophylaxis: LMWH      "

## 2021-06-04 NOTE — CARE PLAN
Problem: Knowledge Deficit - Standard  Goal: Patient and family/care givers will demonstrate understanding of plan of care, disease process/condition, diagnostic tests and medications  Outcome: Progressing     Problem: Fall Risk  Goal: Patient will remain free from falls  Outcome: Progressing     Problem: Depression  Goal: Patient and family/caregiver will verbalize accurate information about at least two of the possible causes of depression, three-four of the signs and symptoms of depression  Outcome: Progressing     The patient is Watcher - Medium risk of patient condition declining or worsening    Shift Goals  Clinical Goals: wean oxygen saturation below 4L  Patient Goals: rest    Progress made toward(s) clinical / shift goals:  Pt education about POC, appropriate response to steps necessary for completion. Pt accepting to proper safety mechanisms in place to prevent falls.

## 2021-06-05 LAB
ANION GAP SERPL CALC-SCNC: 7 MMOL/L (ref 7–16)
BACTERIA BLD CULT: NORMAL
BACTERIA BLD CULT: NORMAL
BUN SERPL-MCNC: 11 MG/DL (ref 8–22)
CALCIUM SERPL-MCNC: 8.5 MG/DL (ref 8.5–10.5)
CHLORIDE SERPL-SCNC: 104 MMOL/L (ref 96–112)
CO2 SERPL-SCNC: 26 MMOL/L (ref 20–33)
CREAT SERPL-MCNC: 1 MG/DL (ref 0.5–1.4)
ERYTHROCYTE [DISTWIDTH] IN BLOOD BY AUTOMATED COUNT: 42.1 FL (ref 35.9–50)
GLUCOSE SERPL-MCNC: 128 MG/DL (ref 65–99)
HCT VFR BLD AUTO: 42.2 % (ref 42–52)
HGB BLD-MCNC: 13.7 G/DL (ref 14–18)
MCH RBC QN AUTO: 27.5 PG (ref 27–33)
MCHC RBC AUTO-ENTMCNC: 32.5 G/DL (ref 33.7–35.3)
MCV RBC AUTO: 84.7 FL (ref 81.4–97.8)
PLATELET # BLD AUTO: 292 K/UL (ref 164–446)
PMV BLD AUTO: 10.1 FL (ref 9–12.9)
POTASSIUM SERPL-SCNC: 4 MMOL/L (ref 3.6–5.5)
RBC # BLD AUTO: 4.98 M/UL (ref 4.7–6.1)
SIGNIFICANT IND 70042: NORMAL
SIGNIFICANT IND 70042: NORMAL
SITE SITE: NORMAL
SITE SITE: NORMAL
SODIUM SERPL-SCNC: 137 MMOL/L (ref 135–145)
SOURCE SOURCE: NORMAL
SOURCE SOURCE: NORMAL
WBC # BLD AUTO: 7.5 K/UL (ref 4.8–10.8)

## 2021-06-05 PROCEDURE — 85027 COMPLETE CBC AUTOMATED: CPT

## 2021-06-05 PROCEDURE — 700102 HCHG RX REV CODE 250 W/ 637 OVERRIDE(OP): Performed by: HOSPITALIST

## 2021-06-05 PROCEDURE — 700111 HCHG RX REV CODE 636 W/ 250 OVERRIDE (IP): Performed by: HOSPITALIST

## 2021-06-05 PROCEDURE — 700102 HCHG RX REV CODE 250 W/ 637 OVERRIDE(OP): Performed by: STUDENT IN AN ORGANIZED HEALTH CARE EDUCATION/TRAINING PROGRAM

## 2021-06-05 PROCEDURE — 770001 HCHG ROOM/CARE - MED/SURG/GYN PRIV*

## 2021-06-05 PROCEDURE — 700111 HCHG RX REV CODE 636 W/ 250 OVERRIDE (IP): Performed by: STUDENT IN AN ORGANIZED HEALTH CARE EDUCATION/TRAINING PROGRAM

## 2021-06-05 PROCEDURE — A9270 NON-COVERED ITEM OR SERVICE: HCPCS | Performed by: STUDENT IN AN ORGANIZED HEALTH CARE EDUCATION/TRAINING PROGRAM

## 2021-06-05 PROCEDURE — 36415 COLL VENOUS BLD VENIPUNCTURE: CPT

## 2021-06-05 PROCEDURE — A9270 NON-COVERED ITEM OR SERVICE: HCPCS | Performed by: HOSPITALIST

## 2021-06-05 PROCEDURE — 80048 BASIC METABOLIC PNL TOTAL CA: CPT

## 2021-06-05 PROCEDURE — 700105 HCHG RX REV CODE 258: Performed by: HOSPITALIST

## 2021-06-05 PROCEDURE — 99232 SBSQ HOSP IP/OBS MODERATE 35: CPT | Performed by: HOSPITALIST

## 2021-06-05 RX ORDER — AMLODIPINE BESYLATE 5 MG/1
5 TABLET ORAL
Status: DISCONTINUED | OUTPATIENT
Start: 2021-06-05 | End: 2021-06-07 | Stop reason: HOSPADM

## 2021-06-05 RX ADMIN — OXYCODONE 5 MG: 5 TABLET ORAL at 16:15

## 2021-06-05 RX ADMIN — PIPERACILLIN AND TAZOBACTAM 4.5 G: 4; .5 INJECTION, POWDER, LYOPHILIZED, FOR SOLUTION INTRAVENOUS; PARENTERAL at 04:03

## 2021-06-05 RX ADMIN — MORPHINE SULFATE 4 MG: 4 INJECTION INTRAVENOUS at 04:11

## 2021-06-05 RX ADMIN — AMLODIPINE BESYLATE 5 MG: 5 TABLET ORAL at 10:33

## 2021-06-05 RX ADMIN — APIXABAN 10 MG: 5 TABLET, FILM COATED ORAL at 12:53

## 2021-06-05 RX ADMIN — APIXABAN 10 MG: 5 TABLET, FILM COATED ORAL at 00:17

## 2021-06-05 RX ADMIN — PIPERACILLIN AND TAZOBACTAM 4.5 G: 4; .5 INJECTION, POWDER, LYOPHILIZED, FOR SOLUTION INTRAVENOUS; PARENTERAL at 12:53

## 2021-06-05 RX ADMIN — PIPERACILLIN AND TAZOBACTAM 4.5 G: 4; .5 INJECTION, POWDER, LYOPHILIZED, FOR SOLUTION INTRAVENOUS; PARENTERAL at 20:59

## 2021-06-05 RX ADMIN — OXYCODONE 5 MG: 5 TABLET ORAL at 08:09

## 2021-06-05 ASSESSMENT — PAIN DESCRIPTION - PAIN TYPE
TYPE: ACUTE PAIN

## 2021-06-05 NOTE — PROGRESS NOTES
Notified MD of BP: 159/111. No new orders. Will continue to monitor.    1008: Notified of BP: 162/109, pt reports no pain or HA. Awaiting orders.     1029: Daily amlodipine added. Will continue to monitor.

## 2021-06-05 NOTE — CARE PLAN
The patient is Stable - Low risk of patient condition declining or worsening    Problem: Knowledge Deficit - Standard  Goal: Patient and family/care givers will demonstrate understanding of plan of care, disease process/condition, diagnostic tests and medications  Outcome: Progressing     Problem: Fall Risk  Goal: Patient will remain free from falls  Outcome: Progressing  Note: Safety and fall education given. All fall precautions are in place with belongings at bedside table. Needs are tended to. Educated to use call light for assistance. Pt verbalized understanding.       Problem: Pain - Standard  Goal: Alleviation of pain or a reduction in pain to the patient’s comfort goal  Outcome: Progressing  Flowsheets (Taken 6/4/2021 2132)  Pain Rating Scale (NPRS): 3  Note: Pt educated about pain control and medication per MAR. Pt verbalized understanding. Hourly rounding in place to assess needs. WCTM.

## 2021-06-05 NOTE — PROGRESS NOTES
Report received from night shift RN, assumed care of pt. Pt A&Ox4. Plan of care discussed with pt, labs and chart reviewed. All needs met at this time. Pt is medical. 2L O2 via NC on. Call light within reach, bed locked and in lowest position. All fall precautions and hourly rounding in place. Will continue to monitor.

## 2021-06-05 NOTE — PROGRESS NOTES
Pt hypertensive with systolic in the 170s. Pt initially declined pain because he is reluctant to take pain medication. When asked about pain again, pt admitted to pain in back and leg and rated pain at 6. Pt will be medicated as per MAR, will continue to monitor BP and if it will decrease with pain management.

## 2021-06-05 NOTE — CARE PLAN
The patient is Watcher - Medium risk of patient condition declining or worsening    Shift Goals  Clinical Goals: Wean Oxygen, manage BP  Patient Goals: Pain management    Progress made toward(s) clinical / shift goals:    Pt more willing to honestly report pain and take medications to manage pain after education.   Problem: Knowledge Deficit - Standard  Goal: Patient and family/care givers will demonstrate understanding of plan of care, disease process/condition, diagnostic tests and medications  6/5/2021 1336 by TONE Garrett.N.  Outcome: Progressing  Pt verbalizes understanding of plan of care. Appears motivated to recover and leave hospital when safe to do so.   Problem: Fall Risk  Goal: Patient will remain free from falls  6/5/2021 1336 by Meryl Andrade, R.N.  Outcome: Progressing  Pt steady on feet, requires assistance with ambulation only to manage cords and IV pole. Bed locked and in lowest position, bed alarm on. Pt calls appropriately.    Patient is not progressing towards the following goals:    Problem: Respiratory  Goal: Patient will achieve/maintain optimum respiratory ventilation and gas exchange  6/5/2021 1336 by Meryl Andrade, R.N.  Outcome: Not Progressing  Pt requires 2L oxygen to maintain saturations >90%. Attempt at weaning was unsuccessful.

## 2021-06-05 NOTE — PROGRESS NOTES
Hospital Medicine Daily Progress Note    Date of Service  6/5/2021    Chief Complaint  71 y.o. male admitted 5/30/2021 with chest discomfort found to have extensive pulmonary emboli on CTA.    Hospital Course  No notes on file    Interval Problem Update  No acute overnight events.  Patient reports no interval worsening in his respiratory symptoms.  No chest discomfort.  Tolerating anticoagulation well without any bleeds.    Consultants/Specialty  Assistance of Dr. Booth from pulmonary medicine greatly appreciated.    Code Status  Full Code    Disposition  Pending at this juncture.  Stable for floor.       Review of Systems  All systems reviewed and negative except as noted per above.    Physical Exam  Temp:  [36.6 °C (97.8 °F)-36.9 °C (98.5 °F)] 36.8 °C (98.3 °F)  Pulse:  [82-89] 89  Resp:  [18] 18  BP: (135-178)/() 158/104  SpO2:  [92 %-95 %] 95 %    General: No acute distress  HEENT atraumatic, normocephalic, pupils equal round reactive to light  Neck: No JVD  Chest: Respirations are unlabored  Cardiac: Physiologic S1 and S2  Abdomen: Soft, nontender, nondistended  Extremities: Without clubbing, cyanosis or edema  Neuro: Cranial nerves II through XII are grossly intact.  Psych: No anxiety, judgement intact.        Current Facility-Administered Medications:   •  amLODIPine (NORVASC) tablet 5 mg, 5 mg, Oral, Q DAY, Srinath Kent M.D., 5 mg at 06/05/21 1033  •  calcium carbonate (OS-) tablet 500 mg, 500 mg, Oral, Q6HRS PRN, Edmundo Clifton M.D., 500 mg at 06/02/21 0530  •  [COMPLETED] piperacillin-tazobactam (ZOSYN) 4.5 g in  mL IVPB, 4.5 g, Intravenous, Once, Stopped at 06/02/21 1027 **AND** piperacillin-tazobactam (ZOSYN) 4.5 g in  mL IVPB, 4.5 g, Intravenous, Q8HRS, Srinath Kent M.D., Stopped at 06/05/21 0803  •  apixaban (ELIQUIS) tablet 10 mg, 10 mg, Oral, BID, 10 mg at 06/05/21 0017 **FOLLOWED BY** [START ON 6/10/2021] apixaban (ELIQUIS) tablet 5 mg, 5 mg, Oral, BID, Srinath FARIAS  SAM Kent  •  acetaminophen (Tylenol) tablet 650 mg, 650 mg, Oral, Q6HRS PRN, Christiano Craig M.D., 650 mg at 05/31/21 2005  •  ondansetron (ZOFRAN) syringe/vial injection 4 mg, 4 mg, Intravenous, Q4HRS PRN, Christiano Craig M.D.  •  oxyCODONE immediate-release (ROXICODONE) tablet 5 mg, 5 mg, Oral, Q4HRS PRN, Srinath Whitehead M.D., 5 mg at 06/05/21 0809  •  morphine (pf) 4 mg/mL injection 4 mg, 4 mg, Intravenous, Q4HRS PRN, Srinath Whitehead M.D., 4 mg at 06/05/21 0411      Fluids    Intake/Output Summary (Last 24 hours) at 6/5/2021 1246  Last data filed at 6/5/2021 1000  Gross per 24 hour   Intake 480 ml   Output 4400 ml   Net -3920 ml       Laboratory  Recent Labs     06/03/21  0231 06/04/21  0611 06/05/21  0333   WBC 6.9 7.8 7.5   RBC 4.76 5.04 4.98   HEMOGLOBIN 13.2* 14.2 13.7*   HEMATOCRIT 40.9* 43.7 42.2   MCV 85.9 86.7 84.7   MCH 27.7 28.2 27.5   MCHC 32.3* 32.5* 32.5*   RDW 42.0 42.9 42.1   PLATELETCT 235 279 292   MPV 10.7 10.1 10.1     Recent Labs     06/03/21  0231 06/04/21  0611 06/05/21  0333   SODIUM 136 140 137   POTASSIUM 4.1 4.8 4.0   CHLORIDE 103 103 104   CO2 23 30 26   GLUCOSE 133* 91 128*   BUN 15 12 11   CREATININE 1.12 1.05 1.00   CALCIUM 8.1* 8.8 8.5                   Imaging  US-RENAL   Final Result      Unremarkable kidneys. No hydronephrosis.      US-EXTREMITY VENOUS LOWER BILAT   Final Result      CT-CTA CHEST PULMONARY ARTERY W/ RECONS   Final Result         Extensive bilateral pulmonary emboli as detailed above with CT findings suggesting right heart strain.         These findings were discussed with ALEXANDER POWERS on 5/31/2021 3:52 PM.            EC-ECHOCARDIOGRAM COMPLETE W/O CONT   Final Result      DX-CHEST-PORTABLE (1 VIEW)   Final Result         No acute cardiac or pulmonary abnormality is identified.        Results     Procedure Component Value Units Date/Time    BLOOD CULTURE [[id number]] Collected: 05/30/21 0293    Order Status: Completed Specimen: Blood from Peripheral Updated:  "06/05/21 0300     Significant Indicator NEG     Source BLD     Site PERIPHERAL     Culture Result No growth after 5 days of incubation.    Narrative:      Per Hospital Policy: Only change Specimen Src: to \"Line\" if  specified by physician order.  No site indicated    BLOOD CULTURE [[id number]] Collected: 05/30/21 2319    Order Status: Completed Specimen: Blood from Peripheral Updated: 06/05/21 0300     Significant Indicator NEG     Source BLD     Site PERIPHERAL     Culture Result No growth after 5 days of incubation.    Narrative:      Per Hospital Policy: Only change Specimen Src: to \"Line\" if  specified by physician order.  No site indicated    URINE CULTURE(NEW) [[id number]]  (Abnormal)  (Susceptibility) Collected: 05/31/21 0301    Order Status: Completed Specimen: Urine Updated: 06/02/21 0802     Significant Indicator POS     Source UR     Site -     Culture Result -      Escherichia coli ESBL  >100,000 cfu/mL  Extended Spectrum Beta-lactamase (ESBL) isolated.  ESBL's may be clinically resistant to therapy with  Penicillins,Cephalosporins or Aztreonam despite  apparent in vitro susceptibility to some of these agents.  The patient requires contact isolation.  Please contact pharmacy or an Infectious Disease Specialist  if you have any questions about appropriate therapy.      Narrative:      CALL  Marte  171 tel. [phone],  CALLED  171 tel. [phone] 06/02/2021, 08:02, RB PERF. RESULTS CALLED  TO:[id number] RN  Indication for culture:->Emergency Room Patient  Indication for culture:->Emergency Room Patient    Susceptibility     Escherichia coli esbl (1)     Antibiotic Interpretation Microscan Method Status    Ampicillin Resistant >16 mcg/mL CORDELIA Final    Ceftriaxone Extended Spectrum Beta Lactamase >32 mcg/mL CORDELIA Final    Cefazolin Resistant >16 mcg/mL CORDELIA Final    Ciprofloxacin Resistant >2 mcg/mL CORDELIA Final    Cefepime Resistant >16 mcg/mL CORDELIA Final    Cefuroxime Resistant >16 mcg/mL CORDELIA Final    Tobramycin Sensitive " <=2 mcg/mL CORDELIA Final    Nitrofurantoin Resistant >64 mcg/mL CORDELIA Final    Gentamicin Sensitive <=2 mcg/mL CORDELIA Final    Levofloxacin Resistant >4 mcg/mL CORDELIA Final    Pip/Tazobactam Sensitive <=8 mcg/mL CORDELIA Final    Trimeth/Sulfa Sensitive <=0.5/9.5 mcg/mL CORDELIA Final    Tigecycline Sensitive <=2 mcg/mL CORDELIA Final                   URINALYSIS [[id number]]  (Abnormal) Collected: 05/31/21 0301    Order Status: Completed Specimen: Urine Updated: 05/31/21 0336     Color Yellow     Character Cloudy     Specific Gravity 1.021     Ph 5.0     Glucose Negative mg/dL      Ketones Negative mg/dL      Protein 100 mg/dL      Bilirubin Negative     Urobilinogen, Urine 0.2     Nitrite Positive     Leukocyte Esterase Moderate     Occult Blood Moderate     Micro Urine Req Microscopic    Narrative:      Indication for culture:->Emergency Room Patient               Assessment/Plan  * NSTEMI (non-ST elevated myocardial infarction) (HCC)  Assessment & Plan  Admit patient to  telemetry unit  Initial troponin 41 -> 38, likely demand ischemia from large PE.    EKG - NSR w/ non specific T wave changes in lateral leads  Echocardiogram      Acute pulmonary embolism with acute cor pulmonale (HCC)  Assessment & Plan  Pulmonary following along, can consider catheter-based interventions at their discretion.    Continue anticoagulation as ordered (LM[facility] to [facility] on 6/2).      Obesity (BMI 30-39.9)  Assessment & Plan  15 minutes spent counseling patient on benefits of nutrition and healthy lifestyle    UTI (urinary tract infection)  Assessment & Plan  Admit patient to medical floor  Urine culture w/ ESBL, ABX per antimicorbial stewardship.    Blood cultures NGTD. Compled 5 days therapy on 6/5  Lactic acid elevated initially, trended to normal         VTE prophylaxis: LMWH

## 2021-06-05 NOTE — PROGRESS NOTES
Bedside report received and patient care assumed. Pt is resting in bed, A&O4, with no complaints of pain, and is on 2L nasal canula. Pt is medical. All fall precautions are in place, belongings at bedside table.  Pt was updated on POC, no questions or concerns. Pt educated on use of call light for assistance. Will continue to monitor.

## 2021-06-06 LAB
ANION GAP SERPL CALC-SCNC: 9 MMOL/L (ref 7–16)
BUN SERPL-MCNC: 14 MG/DL (ref 8–22)
CALCIUM SERPL-MCNC: 8.6 MG/DL (ref 8.5–10.5)
CHLORIDE SERPL-SCNC: 104 MMOL/L (ref 96–112)
CO2 SERPL-SCNC: 26 MMOL/L (ref 20–33)
CREAT SERPL-MCNC: 1.01 MG/DL (ref 0.5–1.4)
ERYTHROCYTE [DISTWIDTH] IN BLOOD BY AUTOMATED COUNT: 42.5 FL (ref 35.9–50)
GLUCOSE SERPL-MCNC: 101 MG/DL (ref 65–99)
HCT VFR BLD AUTO: 47.4 % (ref 42–52)
HGB BLD-MCNC: 15.5 G/DL (ref 14–18)
MCH RBC QN AUTO: 27.8 PG (ref 27–33)
MCHC RBC AUTO-ENTMCNC: 32.7 G/DL (ref 33.7–35.3)
MCV RBC AUTO: 84.9 FL (ref 81.4–97.8)
PLATELET # BLD AUTO: 332 K/UL (ref 164–446)
PMV BLD AUTO: 9.8 FL (ref 9–12.9)
POTASSIUM SERPL-SCNC: 4.5 MMOL/L (ref 3.6–5.5)
RBC # BLD AUTO: 5.58 M/UL (ref 4.7–6.1)
SODIUM SERPL-SCNC: 139 MMOL/L (ref 135–145)
WBC # BLD AUTO: 8.3 K/UL (ref 4.8–10.8)

## 2021-06-06 PROCEDURE — 80048 BASIC METABOLIC PNL TOTAL CA: CPT

## 2021-06-06 PROCEDURE — 770001 HCHG ROOM/CARE - MED/SURG/GYN PRIV*

## 2021-06-06 PROCEDURE — A9270 NON-COVERED ITEM OR SERVICE: HCPCS | Performed by: HOSPITALIST

## 2021-06-06 PROCEDURE — 700102 HCHG RX REV CODE 250 W/ 637 OVERRIDE(OP): Performed by: STUDENT IN AN ORGANIZED HEALTH CARE EDUCATION/TRAINING PROGRAM

## 2021-06-06 PROCEDURE — 85027 COMPLETE CBC AUTOMATED: CPT

## 2021-06-06 PROCEDURE — 36415 COLL VENOUS BLD VENIPUNCTURE: CPT

## 2021-06-06 PROCEDURE — 700102 HCHG RX REV CODE 250 W/ 637 OVERRIDE(OP): Performed by: HOSPITALIST

## 2021-06-06 PROCEDURE — 99232 SBSQ HOSP IP/OBS MODERATE 35: CPT | Performed by: HOSPITALIST

## 2021-06-06 PROCEDURE — A9270 NON-COVERED ITEM OR SERVICE: HCPCS | Performed by: STUDENT IN AN ORGANIZED HEALTH CARE EDUCATION/TRAINING PROGRAM

## 2021-06-06 RX ADMIN — OXYCODONE 5 MG: 5 TABLET ORAL at 04:05

## 2021-06-06 RX ADMIN — OXYCODONE 5 MG: 5 TABLET ORAL at 13:00

## 2021-06-06 RX ADMIN — AMLODIPINE BESYLATE 5 MG: 5 TABLET ORAL at 04:05

## 2021-06-06 RX ADMIN — OXYCODONE 5 MG: 5 TABLET ORAL at 21:25

## 2021-06-06 RX ADMIN — APIXABAN 10 MG: 5 TABLET, FILM COATED ORAL at 12:59

## 2021-06-06 RX ADMIN — OXYCODONE 5 MG: 5 TABLET ORAL at 15:58

## 2021-06-06 RX ADMIN — APIXABAN 10 MG: 5 TABLET, FILM COATED ORAL at 00:03

## 2021-06-06 RX ADMIN — OXYCODONE 5 MG: 5 TABLET ORAL at 16:08

## 2021-06-06 RX ADMIN — OXYCODONE 5 MG: 5 TABLET ORAL at 09:19

## 2021-06-06 ASSESSMENT — PAIN DESCRIPTION - PAIN TYPE
TYPE: ACUTE PAIN

## 2021-06-06 ASSESSMENT — PAIN SCALES - WONG BAKER
WONGBAKER_NUMERICALRESPONSE: HURTS JUST A LITTLE BIT

## 2021-06-06 NOTE — PROGRESS NOTES
Hospital Medicine Daily Progress Note    Date of Service  6/6/2021    Chief Complaint  71 y.o. male admitted 5/30/2021 with chest discomfort found to have extensive pulmonary emboli on CTA.    Hospital Course  No notes on file    Interval Problem Update  No acute overnight events.  Patient reports no interval worsening in his respiratory symptoms.  No chest discomfort.  Tolerating anticoagulation well without any bleeds.    Consultants/Specialty  Assistance of Dr. Booth from pulmonary medicine greatly appreciated.    Code Status  Full Code    Disposition  Pending at this juncture.  Stable for floor.       Review of Systems  All systems reviewed and negative except as noted per above.    Physical Exam  Temp:  [36.4 °C (97.6 °F)-37 °C (98.6 °F)] 36.4 °C (97.6 °F)  Pulse:  [85-88] 85  Resp:  [18-20] 18  BP: (145-154)/() 145/69  SpO2:  [92 %-98 %] 95 %    General: No acute distress  HEENT atraumatic, normocephalic, pupils equal round reactive to light  Neck: No JVD  Chest: Respirations are unlabored  Cardiac: Physiologic S1 and S2  Abdomen: Soft, nontender, nondistended  Extremities: Without clubbing, cyanosis or edema  Neuro: Cranial nerves II through XII are grossly intact.  Psych: No anxiety, judgement intact.        Current Facility-Administered Medications:   •  amLODIPine (NORVASC) tablet 5 mg, 5 mg, Oral, Q DAY, Srinath Kent M.D., 5 mg at 06/06/21 0405  •  calcium carbonate (OS-) tablet 500 mg, 500 mg, Oral, Q6HRS PRN, Edmundo Clifton M.D., 500 mg at 06/02/21 0530  •  apixaban (ELIQUIS) tablet 10 mg, 10 mg, Oral, BID, 10 mg at 06/06/21 1259 **FOLLOWED BY** [START ON 6/10/2021] apixaban (ELIQUIS) tablet 5 mg, 5 mg, Oral, BID, Srinath Kent M.D.  •  acetaminophen (Tylenol) tablet 650 mg, 650 mg, Oral, Q6HRS PRN, Christiano Craig M.D., 650 mg at 05/31/21 2005  •  ondansetron (ZOFRAN) syringe/vial injection 4 mg, 4 mg, Intravenous, Q4HRS PRN, Christiano Craig M.D.  •  oxyCODONE  "immediate-release (ROXICODONE) tablet 5 mg, 5 mg, Oral, Q4HRS PRN, Srinath Whitehead M.D., 5 mg at 06/06/21 1300  •  morphine (pf) 4 mg/mL injection 4 mg, 4 mg, Intravenous, Q4HRS PRN, Srinath Whitehead M.D., 4 mg at 06/05/21 0411      Fluids    Intake/Output Summary (Last 24 hours) at 6/6/2021 1524  Last data filed at 6/6/2021 0649  Gross per 24 hour   Intake --   Output 1800 ml   Net -1800 ml       Laboratory  Recent Labs     06/04/21  0611 06/05/21  0333 06/06/21  0510   WBC 7.8 7.5 8.3   RBC 5.04 4.98 5.58   HEMOGLOBIN 14.2 13.7* 15.5   HEMATOCRIT 43.7 42.2 47.4   MCV 86.7 84.7 84.9   MCH 28.2 27.5 27.8   MCHC 32.5* 32.5* 32.7*   RDW 42.9 42.1 42.5   PLATELETCT 279 292 332   MPV 10.1 10.1 9.8     Recent Labs     06/04/21  0611 06/05/21  0333 06/06/21  0510   SODIUM 140 137 139   POTASSIUM 4.8 4.0 4.5   CHLORIDE 103 104 104   CO2 30 26 26   GLUCOSE 91 128* 101*   BUN 12 11 14   CREATININE 1.05 1.00 1.01   CALCIUM 8.8 8.5 8.6                   Imaging  US-RENAL   Final Result      Unremarkable kidneys. No hydronephrosis.      US-EXTREMITY VENOUS LOWER BILAT   Final Result      CT-CTA CHEST PULMONARY ARTERY W/ RECONS   Final Result         Extensive bilateral pulmonary emboli as detailed above with CT findings suggesting right heart strain.         These findings were discussed with ALEXANDER POWERS on 5/31/2021 3:52 PM.            EC-ECHOCARDIOGRAM COMPLETE W/O CONT   Final Result      DX-CHEST-PORTABLE (1 VIEW)   Final Result         No acute cardiac or pulmonary abnormality is identified.        Results     Procedure Component Value Units Date/Time    BLOOD CULTURE [[id number]] Collected: 05/30/21 0952    Order Status: Completed Specimen: Blood from Peripheral Updated: 06/05/21 0300     Significant Indicator NEG     Source BLD     Site PERIPHERAL     Culture Result No growth after 5 days of incubation.    Narrative:      Per Hospital Policy: Only change Specimen Src: to \"Line\" if  specified by physician order.  No site " "indicated    BLOOD CULTURE [[id number]] Collected: 05/30/21 2319    Order Status: Completed Specimen: Blood from Peripheral Updated: 06/05/21 0300     Significant Indicator NEG     Source BLD     Site PERIPHERAL     Culture Result No growth after 5 days of incubation.    Narrative:      Per Hospital Policy: Only change Specimen Src: to \"Line\" if  specified by physician order.  No site indicated    URINE CULTURE(NEW) [[id number]]  (Abnormal)  (Susceptibility) Collected: 05/31/21 0301    Order Status: Completed Specimen: Urine Updated: 06/02/21 0802     Significant Indicator POS     Source UR     Site -     Culture Result -      Escherichia coli ESBL  >100,000 cfu/mL  Extended Spectrum Beta-lactamase (ESBL) isolated.  ESBL's may be clinically resistant to therapy with  Penicillins,Cephalosporins or Aztreonam despite  apparent in vitro susceptibility to some of these agents.  The patient requires contact isolation.  Please contact pharmacy or an Infectious Disease Specialist  if you have any questions about appropriate therapy.      Narrative:      CALL  Marte  171 tel. [phone],  CALLED  171 tel. [phone] 06/02/2021, 08:02, RB PERF. RESULTS CALLED  TO:[phone] RN  Indication for culture:->Emergency Room Patient  Indication for culture:->Emergency Room Patient    Susceptibility     Escherichia coli esbl (1)     Antibiotic Interpretation Microscan Method Status    Ampicillin Resistant >16 mcg/mL CORDELIA Final    Ceftriaxone Extended Spectrum Beta Lactamase >32 mcg/mL CORDELIA Final    Cefazolin Resistant >16 mcg/mL CORDELIA Final    Ciprofloxacin Resistant >2 mcg/mL CORDELIA Final    Cefepime Resistant >16 mcg/mL CORDELIA Final    Cefuroxime Resistant >16 mcg/mL CORDELIA Final    Tobramycin Sensitive <=2 mcg/mL CORDELIA Final    Nitrofurantoin Resistant >64 mcg/mL CORDELIA Final    Gentamicin Sensitive <=2 mcg/mL CORDELIA Final    Levofloxacin Resistant >4 mcg/mL CORDELIA Final    Pip/Tazobactam Sensitive <=8 mcg/mL CORDELIA Final    Trimeth/Sulfa Sensitive <=0.5/9.5 mcg/mL " CORDELIA Final    Tigecycline Sensitive <=2 mcg/mL CORDELIA Final                   URINALYSIS [[id number]]  (Abnormal) Collected: 05/31/21 0301    Order Status: Completed Specimen: Urine Updated: 05/31/21 0336     Color Yellow     Character Cloudy     Specific Gravity 1.021     Ph 5.0     Glucose Negative mg/dL      Ketones Negative mg/dL      Protein 100 mg/dL      Bilirubin Negative     Urobilinogen, Urine 0.2     Nitrite Positive     Leukocyte Esterase Moderate     Occult Blood Moderate     Micro Urine Req Microscopic    Narrative:      Indication for culture:->Emergency Room Patient               Assessment/Plan  * NSTEMI (non-ST elevated myocardial infarction) (HCC)  Assessment & Plan  Admit patient to  telemetry unit  Initial troponin 41 -> 38, likely demand ischemia from large PE.    EKG - NSR w/ non specific T wave changes in lateral leads  Echocardiogram      Acute pulmonary embolism with acute cor pulmonale (HCC)  Assessment & Plan  Pulmonary following along, can consider catheter-based interventions at their discretion.    Continue anticoagulation as ordered (LMWH to [facility] on 6/2).      Obesity (BMI 30-39.9)  Assessment & Plan  15 minutes spent counseling patient on benefits of nutrition and healthy lifestyle    UTI (urinary tract infection)  Assessment & Plan  Admit patient to medical floor  Urine culture w/ ESBL, ABX per antimicorbial stewardship.    Blood cultures NGTD. Compled 5 days therapy on 6/5  Lactic acid elevated initially, trended to normal         VTE prophylaxis: LMWH

## 2021-06-06 NOTE — PROGRESS NOTES
Bedside report received and patient care assumed. Pt is resting in bed, A&O4, with no complaints of pain, and is on 1L nasal canula. Pt is medical. All fall precautions are in place, belongings at bedside table.  Pt was updated on POC, no questions or concerns. Pt educated on use of call light for assistance. Will continue to monitor.

## 2021-06-07 ENCOUNTER — PHARMACY VISIT (OUTPATIENT)
Dept: PHARMACY | Facility: MEDICAL CENTER | Age: 71
End: 2021-06-07
Payer: COMMERCIAL

## 2021-06-07 VITALS
RESPIRATION RATE: 17 BRPM | DIASTOLIC BLOOD PRESSURE: 114 MMHG | HEART RATE: 88 BPM | OXYGEN SATURATION: 93 % | BODY MASS INDEX: 33.86 KG/M2 | HEIGHT: 69 IN | SYSTOLIC BLOOD PRESSURE: 164 MMHG | WEIGHT: 228.62 LBS | TEMPERATURE: 97.1 F

## 2021-06-07 PROBLEM — N39.0 UTI (URINARY TRACT INFECTION): Status: RESOLVED | Noted: 2021-05-31 | Resolved: 2021-06-07

## 2021-06-07 PROBLEM — I21.4 NSTEMI (NON-ST ELEVATED MYOCARDIAL INFARCTION) (HCC): Status: RESOLVED | Noted: 2021-05-31 | Resolved: 2021-06-07

## 2021-06-07 LAB
ANION GAP SERPL CALC-SCNC: 10 MMOL/L (ref 7–16)
BUN SERPL-MCNC: 15 MG/DL (ref 8–22)
CALCIUM SERPL-MCNC: 8.8 MG/DL (ref 8.5–10.5)
CHLORIDE SERPL-SCNC: 99 MMOL/L (ref 96–112)
CO2 SERPL-SCNC: 23 MMOL/L (ref 20–33)
CREAT SERPL-MCNC: 0.92 MG/DL (ref 0.5–1.4)
EKG IMPRESSION: NORMAL
ERYTHROCYTE [DISTWIDTH] IN BLOOD BY AUTOMATED COUNT: 40.6 FL (ref 35.9–50)
GLUCOSE SERPL-MCNC: 101 MG/DL (ref 65–99)
HCT VFR BLD AUTO: 45.5 % (ref 42–52)
HGB BLD-MCNC: 15.5 G/DL (ref 14–18)
MCH RBC QN AUTO: 28 PG (ref 27–33)
MCHC RBC AUTO-ENTMCNC: 34.1 G/DL (ref 33.7–35.3)
MCV RBC AUTO: 82.3 FL (ref 81.4–97.8)
PLATELET # BLD AUTO: 356 K/UL (ref 164–446)
PMV BLD AUTO: 9.6 FL (ref 9–12.9)
POTASSIUM SERPL-SCNC: 4.1 MMOL/L (ref 3.6–5.5)
RBC # BLD AUTO: 5.53 M/UL (ref 4.7–6.1)
SODIUM SERPL-SCNC: 132 MMOL/L (ref 135–145)
WBC # BLD AUTO: 10.2 K/UL (ref 4.8–10.8)

## 2021-06-07 PROCEDURE — RXMED WILLOW AMBULATORY MEDICATION CHARGE: Performed by: HOSPITALIST

## 2021-06-07 PROCEDURE — 700111 HCHG RX REV CODE 636 W/ 250 OVERRIDE (IP): Performed by: STUDENT IN AN ORGANIZED HEALTH CARE EDUCATION/TRAINING PROGRAM

## 2021-06-07 PROCEDURE — 99239 HOSP IP/OBS DSCHRG MGMT >30: CPT | Performed by: HOSPITALIST

## 2021-06-07 PROCEDURE — 93010 ELECTROCARDIOGRAM REPORT: CPT | Performed by: INTERNAL MEDICINE

## 2021-06-07 PROCEDURE — 700102 HCHG RX REV CODE 250 W/ 637 OVERRIDE(OP): Performed by: STUDENT IN AN ORGANIZED HEALTH CARE EDUCATION/TRAINING PROGRAM

## 2021-06-07 PROCEDURE — A9270 NON-COVERED ITEM OR SERVICE: HCPCS | Performed by: STUDENT IN AN ORGANIZED HEALTH CARE EDUCATION/TRAINING PROGRAM

## 2021-06-07 PROCEDURE — 80048 BASIC METABOLIC PNL TOTAL CA: CPT

## 2021-06-07 PROCEDURE — A9270 NON-COVERED ITEM OR SERVICE: HCPCS | Performed by: HOSPITALIST

## 2021-06-07 PROCEDURE — 36415 COLL VENOUS BLD VENIPUNCTURE: CPT

## 2021-06-07 PROCEDURE — 700102 HCHG RX REV CODE 250 W/ 637 OVERRIDE(OP): Performed by: HOSPITALIST

## 2021-06-07 PROCEDURE — 85027 COMPLETE CBC AUTOMATED: CPT

## 2021-06-07 PROCEDURE — 93005 ELECTROCARDIOGRAM TRACING: CPT | Performed by: HOSPITALIST

## 2021-06-07 RX ORDER — AMLODIPINE BESYLATE 5 MG/1
5 TABLET ORAL DAILY
Qty: 30 TABLET | Refills: 0 | Status: SHIPPED | OUTPATIENT
Start: 2021-06-08

## 2021-06-07 RX ADMIN — MORPHINE SULFATE 4 MG: 4 INJECTION INTRAVENOUS at 04:23

## 2021-06-07 RX ADMIN — ACETAMINOPHEN 650 MG: 325 TABLET, FILM COATED ORAL at 00:38

## 2021-06-07 RX ADMIN — APIXABAN 10 MG: 5 TABLET, FILM COATED ORAL at 00:37

## 2021-06-07 RX ADMIN — AMLODIPINE BESYLATE 5 MG: 5 TABLET ORAL at 06:37

## 2021-06-07 ASSESSMENT — PAIN DESCRIPTION - PAIN TYPE
TYPE: ACUTE PAIN

## 2021-06-07 NOTE — PROGRESS NOTES
Assumed care of PT A&O 4. Pt resting in bed with no signs of labored breathing. On RA. Medical pt. Call light within reach, bed in lowest position, upper bed rails up. Pt was updated on plan of care for the day. Will continue to monitor.

## 2021-06-07 NOTE — CARE PLAN
The patient is Stable - Low risk of patient condition declining or worsening    Shift Goals  Clinical Goals: Pain control.  Sleep comfortably.  Patient Goals: Pain Control  Family Goals: N/A    Progress made toward(s) clinical / shift goals:    Problem: Knowledge Deficit - Standard  Goal: Patient and family/care givers will demonstrate understanding of plan of care, disease process/condition, diagnostic tests and medications  Outcome: Progressing  Note: Pt educated about disease process. Reason why medications are taken. And informed about treatment plan.      Problem: Fall Risk  Goal: Patient will remain free from falls  Outcome: Progressing  Note: Pt mobility assessed at beginning of shift. Pt is standby assist. Fall precautions in place. Non-slip socks on. Bed in lowest locked position. Call light within reach. Pt educated to call for assistance and verbalizes understanding.         Patient is not progressing towards the following goals:

## 2021-06-07 NOTE — DISCHARGE INSTRUCTIONS
Discharge Instructions    Discharged to other by car with friend. Discharged via wheelchair, hospital escort: Yes.  Special equipment needed: Not Applicable    Be sure to schedule a follow-up appointment with your primary care doctor or any specialists as instructed.     Discharge Plan:        I understand that a diet low in cholesterol, fat, and sodium is recommended for good health. Unless I have been given specific instructions below for another diet, I accept this instruction as my diet prescription.   Other diet: 2g sodium     Special Instructions: None    · Is patient discharged on Warfarin / Coumadin?   No     Depression / Suicide Risk    As you are discharged from this [facility] facility, it is important to learn how to keep safe from harming yourself.    Recognize the warning signs:  · Abrupt changes in personality, positive or negative- including increase in energy   · Giving away possessions  · Change in eating patterns- significant weight changes-  positive or negative  · Change in sleeping patterns- unable to sleep or sleeping all the time   · Unwillingness or inability to communicate  · Depression  · Unusual sadness, discouragement and loneliness  · Talk of wanting to die  · Neglect of personal appearance   · Rebelliousness- reckless behavior  · Withdrawal from people/activities they love  · Confusion- inability to concentrate     If you or a loved one observes any of these behaviors or has concerns about self-harm, here's what you can do:  · Talk about it- your feelings and reasons for harming yourself  · Remove any means that you might use to hurt yourself (examples: pills, rope, extension cords, firearm)  · Get professional help from the community (Mental Health, Substance Abuse, psychological counseling)  · Do not be alone:Call your Safe Contact- someone whom you trust who will be there for you.  · Call your local CRISIS HOTLINE 415-1656 or 707-231-7990  · Call your local Children's Mobile  Crisis Response Team [place] (261) 616-1621 or www.GraphOn  · Call the toll free National Suicide Prevention Hotlines   · National Suicide Prevention Lifeline 255-113-NQHE (6467)  · [employer] Line Network 800-SUICIDE (003-2254)        Heart Attack  The heart is a muscle that needs oxygen to survive. A heart attack is a condition that occurs when your heart does not get enough oxygen. When this happens, the heart muscle begins to die. This can cause permanent damage if not treated right away. A heart attack is a medical emergency.  This condition may be called a myocardial infarction, or MI. It is also known as acute coronary syndrome (ACS). ACS is a term used to describe a group of conditions that affect blood flow to the heart.  What are the causes?  This condition may be caused by:  · Atherosclerosis. This occurs when a fatty substance called plaque builds up in the arteries and blocks or reduces blood supply to the heart.  · A blood clot. A blood clot can develop suddenly when plaque breaks up within an artery and blocks blood flow to the heart.  · Low blood pressure.  · An abnormal heartbeat (arrhythmia).  · Conditions that cause a decrease of oxygen to the heart, such as anemiaorrespiratory failure.  · A spasm, or severe tightening, of a blood vessel that cuts off blood flow to the heart.  · Tearing of a coronary artery (spontaneous coronary artery dissection).  · High blood pressure.  What increases the risk?  The following factors may make you more likely to develop this condition:  · Aging. The older you are, the higher your risk.  · Having a personal or family history of chest pain, heart attack, stroke, or narrowing of the arteries in the legs, arms, head, or stomach (peripheral artery disease).  · Being male.  · Smoking.  · Not getting regular exercise.  · Being overweight or obese.  · Having high blood pressure.  · Having high cholesterol (hypercholesterolemia).  · Having  diabetes.  · Drinking too much alcohol.  · Using illegal drugs, such as cocaine or methamphetamine.  What are the signs or symptoms?  Symptoms of this condition may vary, depending on factors like gender and age. Symptoms may include:  · Chest pain. It may feel like:  ? Crushing or squeezing.  ? Tightness, pressure, fullness, or heaviness.  · Pain in the arm, neck, jaw, back, or upper body.  · Shortness of breath.  · Heartburn or upset stomach.  · Nausea.  · Sudden cold sweats.  · Feeling tired.  · Sudden light-headedness.  How is this diagnosed?  This condition may be diagnosed through tests, such as:  · Electrocardiogram (ECG) to measure the electrical activity of your heart.  · Blood tests to check for cardiac markers. These chemicals are released by a damaged heart muscle.  · A test to evaluate blood flow and heart function (coronary angiogram).  · CT scan to see the heart more clearly.  · A test to evaluate the pumping action of the heart (echocardiogram).  How is this treated?  A heart attack must be treated as soon as possible. Treatment may include:  · Medicines to:  ? Break up or dissolve blood clots (fibrinolytic therapy).  ? Thin blood and help prevent blood clots.  ? Treat blood pressure.  ? Improve blood flow to the heart.  ? Reduce pain.  ? Reduce cholesterol.  · Angioplasty and stent placement. These are procedures to widen a blocked artery and keep it open.  · Coronary artery bypass graft, CABG, or open heart surgery. This enables blood to flow to the heart by going around the blocked part of the artery.  · Oxygen therapy if needed.  · Cardiac rehabilitation. This improves your health and well-being through exercise, education, and counseling.  Follow these instructions at home:  Medicines  · Take over-the-counter and prescription medicines only as told by your health care provider.  · Do not take the following medicines unless your health care provider says it is okay to take them:  ? NSAIDs, such  as ibuprofen.  ? Supplements that contain vitamin A, vitamin E, or both.  ? Hormone replacement therapy that contains estrogen with or without progestin.  Lifestyle    · Do not use any products that contain nicotine or tobacco, such as cigarettes, e-cigarettes, and chewing tobacco. If you need help quitting, ask your health care provider.  · Avoid secondhand smoke.  · Exercise regularly. Ask your health care provider about participating in a cardiac rehabilitation program that helps you start exercising safely after a heart attack.  · Eat a heart-healthy diet. Your health care provider will tell you what foods to eat.  · Maintain a healthy weight.  · Learn ways to manage stress.  · Do not use illegal drugs.  Alcohol use  · Do not drink alcohol if:  ? Your health care provider tells you not to drink.  ? You are pregnant, may be pregnant, or are planning to become pregnant.  · If you drink alcohol:  ? Limit how much you use to:  § 0-1 drink a day for women.  § 0-2 drinks a day for men.  ? Be aware of how much alcohol is in your drink. In the [place] one drink equals one 12 oz bottle of beer (355 mL), one 5 oz glass of wine (148 mL), or one 1½ oz glass of hard liquor (44 mL).  General instructions  · Work with your health care provider to manage any other conditions you have, such as high blood pressure or diabetes. These conditions affect your heart.  · Get screened for depression, and seek treatment if needed.  · Keep your vaccinations up to date. Get the flu vaccine every year.  · Keep all follow-up visits as told by your health care provider. This is important.  Contact a health care provider if:  · You feel overwhelmed or sad.  · You have trouble doing your daily activities.  Get help right away if:  · You have sudden, unexplained discomfort in your chest, arms, back, neck, jaw, or upper body.  · You have shortness of breath.  · You suddenly start to sweat or your skin gets clammy.  · You feel nauseous or you  vomit.  · You have unexplained tiredness or weakness.  · You suddenly feel light-headed or dizzy.  · You notice your heart starts to beat fast or feels like it is skipping beats.  · You have blood pressure that is higher than 180/120.  These symptoms may represent a serious problem that is an emergency. Do not wait to see if the symptoms will go away. Get medical help right away. Call your local emergency services (911 in the [place] Do not drive yourself to the hospital.  Summary  · A heart attack, also called myocardial infarction, is a condition that occurs when your heart does not get enough oxygen. This is caused by anything that blocks or reduces blood flow to the heart.  · Treatment is a combination of medicines and surgeries, if needed, to open the blocked arteries and restore blood flow to the heart.  · A heart attack is an emergency. Get help right away if you have sudden discomfort in your chest, arms, back, neck, jaw, or upper body. Seek help if you feel nauseous, you vomit, or you feel light-headed or dizzy.  This information is not intended to replace advice given to you by your health care provider. Make sure you discuss any questions you have with your health care provider.  Document Released: 12/18/2006 Document Revised: 03/26/2020 Document Reviewed: 03/30/2020  Elsevier Patient Education © 2020 [employer]    Pulmonary Embolism    A pulmonary embolism (PE) is a sudden blockage or decrease of blood flow in one or both lungs. Most blockages come from a blood clot that forms in the vein of a lower leg, thigh, or arm (deep vein thrombosis, DVT) and travels to the lungs. A clot is blood that has thickened into a gel or solid. PE is a dangerous and life-threatening condition that needs to be treated right away.  What are the causes?  This condition is usually caused by a blood clot that forms in a vein and moves to the lungs. In rare cases, it may be caused by air, fat, part of a tumor, or other tissue  that moves through the veins and into the lungs.  What increases the risk?  The following factors may make you more likely to develop this condition:  · Experiencing a traumatic injury, such as breaking a hip or leg.  · Having:  ? A spinal cord injury.  ? Orthopedic surgery, especially hip or knee replacement.  ? Any major surgery.  ? A stroke.  ? DVT.  ? Blood clots or blood clotting disease.  ? Long-term (chronic) lung or heart disease.  ? Cancer treated with chemotherapy.  ? A central venous catheter.  · Taking medicines that contain estrogen. These include birth control pills and hormone replacement therapy.  · Being:  ? Pregnant.  ? In the period of time after your baby is delivered (postpartum).  ? Older than age 60.  ? Overweight.  ? A smoker, especially if you have other risks.  What are the signs or symptoms?  Symptoms of this condition usually start suddenly and include:  · Shortness of breath during activity or at rest.  · Coughing, coughing up blood, or coughing up blood-tinged mucus.  · Chest pain that is often worse with deep breaths.  · Rapid or irregular heartbeat.  · Feeling light-headed or dizzy.  · Fainting.  · Feeling anxious.  · Fever.  · Sweating.  · Pain and swelling in a leg. This is a symptom of DVT, which can lead to PE.  How is this diagnosed?  This condition may be diagnosed based on:  · Your medical history.  · A physical exam.  · Blood tests.  · CT pulmonary angiogram. This test checks blood flow in and around your lungs.  · Ventilation-perfusion scan, also called a lung VQ scan. This test measures air flow and blood flow to the lungs.  · An ultrasound of the legs.  How is this treated?  Treatment for this condition depends on many factors, such as the cause of your PE, your risk for bleeding or developing more clots, and other medical conditions you have. Treatment aims to remove, dissolve, or stop blood clots from forming or growing larger. Treatment may include:  · Medicines, such  as:  ? Blood thinning medicines (anticoagulants) to stop clots from forming.  ? Medicines that dissolve clots (thrombolytics).  · Procedures, such as:  ? Using a flexible tube to remove a blood clot (embolectomy) or to deliver medicine to destroy it (catheter-directed thrombolysis).  ? Inserting a filter into a large vein that carries blood to the heart (inferior vena cava). This filter (vena cava filter) catches blood clots before they reach the lungs.  ? Surgery to remove the clot (surgical embolectomy). This is rare.  You may need a combination of immediate, long-term (up to 3 months after diagnosis), and extended (more than 3 months after diagnosis) treatments. Your treatment may continue for several months (maintenance therapy). You and your health care provider will work together to choose the treatment program that is best for you.  Follow these instructions at home:  Medicines  · Take over-the-counter and prescription medicines only as told by your health care provider.  · If you are taking an anticoagulant medicine:  ? Take the medicine every day at the same time each day.  ? Understand what foods and drugs interact with your medicine.  ? Understand the side effects of this medicine, including excessive bruising or bleeding. Ask your health care provider or pharmacist about other side effects.  General instructions  · Wear a medical alert bracelet or carry a medical alert card that says you have had a PE and lists what medicines you take.  · Ask your health care provider when you may return to your normal activities. Avoid sitting or lying for a long time without moving.  · Maintain a healthy weight. Ask your health care provider what weight is healthy for you.  · Do not use any products that contain nicotine or tobacco, such as cigarettes, e-cigarettes, and chewing tobacco. If you need help quitting, ask your health care provider.  · Talk with your health care provider about any travel plans. It is  important to make sure that you are still able to take your medicine while on trips.  · Keep all follow-up visits as told by your health care provider. This is important.  Contact a health care provider if:  · You missed a dose of your blood thinner medicine.  Get help right away if:  · You have:  ? New or increased pain, swelling, warmth, or redness in an arm or leg.  ? Numbness or tingling in an arm or leg.  ? Shortness of breath during activity or at rest.  ? A fever.  ? Chest pain.  ? A rapid or irregular heartbeat.  ? A severe headache.  ? Vision changes.  ? A serious fall or accident, or you hit your head.  ? Stomach (abdominal) pain.  ? Blood in your vomit, stool, or urine.  ? A cut that will not stop bleeding.  · You cough up blood.  · You feel light-headed or dizzy.  · You cannot move your arms or legs.  · You are confused or have memory loss.  These symptoms may represent a serious problem that is an emergency. Do not wait to see if the symptoms will go away. Get medical help right away. Call your local emergency services (911 in the [place] Do not drive yourself to the hospital.  Summary  · A pulmonary embolism (PE) is a sudden blockage or decrease of blood flow in one or both lungs. PE is a dangerous and life-threatening condition that needs to be treated right away.  · Treatments for this condition usually include medicines to thin your blood (anticoagulants) or medicines to break apart blood clots (thrombolytics).  · If you are given blood thinners, it is important to take the medicine every day at the same time each day.  · Understand what foods and drugs interact with any medicines that you are taking.  · If you have signs of PE or DVT, call your local emergency services (911 in the [place]  This information is not intended to replace advice given to you by your health care provider. Make sure you discuss any questions you have with your health care provider.  Document Released: 12/15/2001 Document  Revised: 09/25/2019 Document Reviewed: 09/25/2019  [employer] Patient Education © 2020 [employer]    Urinary Tract Infection, Adult    A urinary tract infection (UTI) is an infection of any part of the urinary tract. The urinary tract includes the kidneys, ureters, bladder, and urethra. These organs make, store, and get rid of urine in the body.  Your health care provider may use other names to describe the infection. An upper UTI affects the ureters and kidneys (pyelonephritis). A lower UTI affects the bladder (cystitis) and urethra (urethritis).  What are the causes?  Most urinary tract infections are caused by bacteria in your genital area, around the entrance to your urinary tract (urethra). These bacteria grow and cause inflammation of your urinary tract.  What increases the risk?  You are more likely to develop this condition if:  · You have a urinary catheter that stays in place (indwelling).  · You are not able to control when you urinate or have a bowel movement (you have incontinence).  · You are female and you:  ? Use a spermicide or diaphragm for birth control.  ? Have low estrogen levels.  ? Are pregnant.  · You have certain genes that increase your risk (genetics).  · You are sexually active.  · You take antibiotic medicines.  · You have a condition that causes your flow of urine to slow down, such as:  ? An enlarged prostate, if you are male.  ? Blockage in your urethra (stricture).  ? A kidney stone.  ? A nerve condition that affects your bladder control (neurogenic bladder).  ? Not getting enough to drink, or not urinating often.  · You have certain medical conditions, such as:  ? Diabetes.  ? A weak disease-fighting system (immunesystem).  ? Sickle cell disease.  ? Gout.  ? Spinal cord injury.  What are the signs or symptoms?  Symptoms of this condition include:  · Needing to urinate right away (urgently).  · Frequent urination or passing small amounts of urine frequently.  · Pain or burning with  urination.  · Blood in the urine.  · Urine that smells bad or unusual.  · Trouble urinating.  · Cloudy urine.  · Vaginal discharge, if you are female.  · Pain in the abdomen or the lower back.  You may also have:  · Vomiting or a decreased appetite.  · Confusion.  · Irritability or tiredness.  · A fever.  · Diarrhea.  The first symptom in older adults may be confusion. In some cases, they may not have any symptoms until the infection has worsened.  How is this diagnosed?  This condition is diagnosed based on your medical history and a physical exam. You may also have other tests, including:  · Urine tests.  · Blood tests.  · Tests for sexually transmitted infections (STIs).  If you have had more than one UTI, a cystoscopy or imaging studies may be done to determine the cause of the infections.  How is this treated?  Treatment for this condition includes:  · Antibiotic medicine.  · Over-the-counter medicines to treat discomfort.  · Drinking enough water to stay hydrated.  If you have frequent infections or have other conditions such as a kidney stone, you may need to see a health care provider who specializes in the urinary tract (urologist).  In rare cases, urinary tract infections can cause sepsis. Sepsis is a life-threatening condition that occurs when the body responds to an infection. Sepsis is treated in the hospital with IV antibiotics, fluids, and other medicines.  Follow these instructions at home:    Medicines  · Take over-the-counter and prescription medicines only as told by your health care provider.  · If you were prescribed an antibiotic medicine, take it as told by your health care provider. Do not stop using the antibiotic even if you start to feel better.  General instructions  · Make sure you:  ? Empty your bladder often and completely. Do not hold urine for long periods of time.  ? Empty your bladder after sex.  ? Wipe from front to back after a bowel movement if you are female. Use each tissue  one time when you wipe.  · Drink enough fluid to keep your urine pale yellow.  · Keep all follow-up visits as told by your health care provider. This is important.  Contact a health care provider if:  · Your symptoms do not get better after 1-2 days.  · Your symptoms go away and then return.  Get help right away if you have:  · Severe pain in your back or your lower abdomen.  · A fever.  · Nausea or vomiting.  Summary  · A urinary tract infection (UTI) is an infection of any part of the urinary tract, which includes the kidneys, ureters, bladder, and urethra.  · Most urinary tract infections are caused by bacteria in your genital area, around the entrance to your urinary tract (urethra).  · Treatment for this condition often includes antibiotic medicines.  · If you were prescribed an antibiotic medicine, take it as told by your health care provider. Do not stop using the antibiotic even if you start to feel better.  · Keep all follow-up visits as told by your health care provider. This is important.  This information is not intended to replace advice given to you by your health care provider. Make sure you discuss any questions you have with your health care provider.  Document Released: 09/27/2006 Document Revised: 12/05/2019 Document Reviewed: 06/27/2019  [employer] Patient Education © 2020 [employer]

## 2021-06-07 NOTE — DISCHARGE PLANNING
@1310  This DPA attempted to give pt 2nd IMM. Pt is in the shower. This DPA will check back in shortly

## 2021-06-07 NOTE — CARE PLAN
Problem: Fall Risk  Goal: Patient will remain free from falls  Outcome: Progressing  Note: Patient has remained free from falls throughout shift.  Fall precautions in place.     Problem: Pain - Standard  Goal: Alleviation of pain or a reduction in pain to the patient’s comfort goal  Outcome: Progressing  Flowsheets (Taken 6/7/2021 0420)  Pain Rating Scale (NPRS): 8  Note: Patient has been receiving pain medications to reduce pain level and sleep comfortably/    The patient is Watcher - Medium risk of patient condition declining or worsening    Shift Goals  Clinical Goals: Pain control.  Sleep comfortably.  Patient Goals: Pain Control  Family Goals: N/A    Progress made toward(s) clinical / shift goals:  Patient stated that the pain medications have been helping to reduce pain allowing patient to sleep comfortably.      Patient is not progressing towards the following goals:N/A

## 2021-06-07 NOTE — DISCHARGE SUMMARY
"Discharge Summary    CHIEF COMPLAINT ON ADMISSION  Chief Complaint   Patient presents with   • N/V     Patient BIB EMS for \"pain all over\". Patient denies pain at this time. Nausea CC, controlled by 4 mg zofran en route       Reason for Admission  EMS     Admission Date  5/30/2021    CODE STATUS  Full Code    HPI & HOSPITAL COURSE  For full details of admission please see the H&P of Dr. Craig dated 5/31/2021, briefly, \"63 y.o. male who presented 5/30/2021 with chest pain.  Patient is from the [facility] and was on a drive to [place] see his kids when his car broke down he was able to get his car to [facility] as he was going to present effects he felt nauseous and had pressure-like chest pain 8 out of 10 on the right side of his chest.  Concern for heart attack he states that he called EMS to be evaluated.  He never has had a heart attack or cardiac work-up before.  States that about 2 weeks ago he noticed gross hematuria in his urine.  Currently when seen at bedside he is asymptomatic.     In the ED, patient found to have normal vital signs.  He has a mild neutrophilic leukocytosis and troponin of 41.  UA is positive for UTI.  Chest x-ray shows no acute cardiopulmonary abnormality.  Patient was given Rocephin and lactated Ringer by ER physician.\"    Patient was seen by pulmonary medicine, and critical medicine.  They did not feel that catheter-based intervention was indicated.  Hypoxia was managed with supplemental O2.  Anticoagulation with heparin drip was eventually transitioned to Eliquis under the guidance of pulmonary medicine.  Patient completed a course of antibiotics for UTI.  Gradually oxygen requirements declined over the following 6 days, eventually on 6/7/2021 patient was saturating well on room air and stable for discharge.      Therefore, he is discharged in good and stable condition to home with close outpatient follow-up.    The patient met 2-midnight criteria for an inpatient stay at the time of " discharge.    Discharge Date  06/07/21      FOLLOW UP ITEMS POST DISCHARGE  None    DISCHARGE DIAGNOSES  Principal Problem (Resolved):    NSTEMI (non-ST elevated myocardial infarction) (HCC) POA: Unknown  Active Problems:    Obesity (BMI 30-39.9) POA: Unknown    Pulmonary hypertension (HCC) POA: Unknown    Acute pulmonary embolism with acute cor pulmonale (HCC) POA: Unknown  Resolved Problems:    UTI (urinary tract infection) POA: Unknown      FOLLOW UP  No future appointments.  No follow-up provider specified.    MEDICATIONS ON DISCHARGE     Medication List      START taking these medications      Instructions   amLODIPine 5 MG Tabs  Start taking on: June 8, 2021  Commonly known as: NORVASC   Take 1 tablet by mouth every day.  Dose: 5 mg     Eliquis 5mg Tabs  Start taking on: Viola 10, 2021  Generic drug: apixaban   Take 1 tablet by mouth 2 times a day.  Dose: 5 mg            Allergies  Allergies   Allergen Reactions   • Codeine Vomiting       DIET  Orders Placed This Encounter   Procedures   • Diet Order Diet: Cardiac     Standing Status:   Standing     Number of Occurrences:   1     Order Specific Question:   Diet:     Answer:   Cardiac [6]       ACTIVITY  As tolerated.  Weight bearing as tolerated    CONSULTATIONS  Dr. Booth, Pulm  Dr. Hammond, [facility] [place]    RADIOLOGY  US-RENAL   Final Result      Unremarkable kidneys. No hydronephrosis.      US-EXTREMITY VENOUS LOWER BILAT   Final Result      CT-CTA CHEST PULMONARY ARTERY W/ RECONS   Final Result         Extensive bilateral pulmonary emboli as detailed above with CT findings suggesting right heart strain.         These findings were discussed with ALEXANDER POWERS on 5/31/2021 3:52 PM.            EC-ECHOCARDIOGRAM COMPLETE W/O CONT   Final Result      DX-CHEST-PORTABLE (1 VIEW)   Final Result         No acute cardiac or pulmonary abnormality is identified.            LABORATORY  Lab Results   Component Value Date    SODIUM 132 (L) 06/07/2021    POTASSIUM 4.1 06/07/2021     CHLORIDE 99 06/07/2021    CO2 23 06/07/2021    GLUCOSE 101 (H) 06/07/2021    BUN 15 06/07/2021    CREATININE 0.92 06/07/2021        Lab Results   Component Value Date    WBC 10.2 06/07/2021    HEMOGLOBIN 15.5 06/07/2021    HEMATOCRIT 45.5 06/07/2021    PLATELETCT 356 06/07/2021        Total time of the discharge process exceeds 32 minutes.

## 2021-06-07 NOTE — PROGRESS NOTES
Received Bedside report. Assumed care at 1900. This pt is AOx4, ambulatory with standby assistance, voiding adequately, 6/10 pain. Patient and RN discussed plan of care: questions answered. Labs noted, assessment complete, patient tolerating cardiac diet. Pt is on room air. Call light in place, fall precautions in place, patient educated on importance of calling for assistance. No additional needs at this time. VSS

## 2021-06-08 NOTE — DISCHARGE PLANNING
Meds-to-Beds: Discharge prescription orders listed below delivered to patient's bedside. SHANNON Mullen notified. Patient counseled.  Approved Services per CM.    Reviewed signs and symptoms of bleeding and when to seek medical attention. Reviewed potential drug-drug interactions.     Howard Quezada Patrice   Home Medication Instructions DARRYL:[id number]    Printed on:06/07/21 [id number]   Medication Information                      amLODIPine (NORVASC) 5 MG Tab  Take 1 tablet by mouth every day.             apixaban (ELIQUIS) 5mg Tab  Take 1 tablet by mouth 2 times a day.                 Shanice Merino, PharmD
